# Patient Record
Sex: FEMALE | Race: WHITE | NOT HISPANIC OR LATINO | Employment: FULL TIME | URBAN - METROPOLITAN AREA
[De-identification: names, ages, dates, MRNs, and addresses within clinical notes are randomized per-mention and may not be internally consistent; named-entity substitution may affect disease eponyms.]

---

## 2015-08-18 LAB — HIV AG/AB 4TH GEN: NEGATIVE

## 2019-04-25 LAB
ABS NRBC COUNT: 0 X 10 3/UL (ref 0–0.01)
ABSOLUTE BASOPHIL: 0.03 X 10 3/UL (ref 0–0.22)
ABSOLUTE EOSINOPHIL: 0.06 X 10 3/UL (ref 0.04–0.54)
ABSOLUTE IMMATURE GRAN: 0.01 X 10 3/UL (ref 0–0.04)
ABSOLUTE LYMPHOCYTE: 1.33 X 10 3/UL (ref 0.86–4.75)
ABSOLUTE MONOCYTE: 0.31 X 10 3/UL (ref 0.22–1.08)
ALBUMIN SERPL-MCNC: 4.6 G/DL (ref 3.5–5.2)
ALBUMIN/GLOB SERPL ELPH: 2 {RATIO} (ref 1–2.7)
ALP ISOS SERPL LEV INH-CCNC: 39 IU/L (ref 35–105)
ALT (SGPT): 14 U/L (ref 0–33)
ANION GAP SERPL CALC-SCNC: 9 MMOL/L (ref 8–17)
AST SERPL-CCNC: 17 U/L (ref 0–32)
B12: 592 PG/ML (ref 232–1245)
BASOPHILS NFR BLD: 0.8 %
BILIRUBIN, TOTAL: 0.26 MG/DL (ref 0–1.2)
BUN/CREAT SERPL: 12 (ref 6–20)
CALCIUM SERPL-MCNC: 9.2 MG/DL (ref 8.6–10.2)
CARBON DIOXIDE, CO2: 29 MMOL/L (ref 22–29)
CHLORIDE: 103 MMOL/L (ref 98–107)
CHOLEST SERPL-MSCNC: 186 MG/DL (ref 100–200)
CREAT SERPL-MCNC: 0.74 MG/DL (ref 0.5–0.9)
EOSINOPHIL NFR BLD: 1.6 %
ESTIMATED AVERAGE GLUCOSE: 100 MG/DL
FOLATE SERPL-MCNC: 19.8 NG/ML (ref 4.4–31)
GFR ESTIMATION: 85.66
GLOBULIN: 2.3 G/DL (ref 1.5–4.5)
GLUCOSE: 101 MG/DL (ref 74–106)
HBA1C MFR BLD: 5.1 % (ref 4–6)
HCT VFR BLD AUTO: 40.5 % (ref 37–47)
HDLC SERPL-MCNC: 85 MG/DL
HGB BLD-MCNC: 13.1 G/DL (ref 12–16)
IMMATURE GRANULOCYTES: 0.3 % (ref 0–0.5)
LDL/HDL RATIO: 1 (ref 1–3)
LDLC SERPL CALC-MCNC: 88.4 MG/DL (ref 0–100)
LYMPHOCYTES NFR BLD: 35.8 %
MCH RBC QN AUTO: 30.5 PG (ref 27–32)
MCHC RBC AUTO-ENTMCNC: 32.3 G/DL (ref 32–36)
MCV RBC AUTO: 94.4 FL (ref 82–100)
MONOCYTES NFR BLD: 8.4 %
NEUTROPHILS ABSOLUTE COUNT: 1.97 X 10 3/UL (ref 2.15–7.56)
NEUTROPHILS NFR BLD: 53.1 %
NUCLEATED RED BLOOD CELLS: 0 /100 WBC (ref 0–0.2)
PLATELET # BLD AUTO: 231 X 10 3/UL (ref 135–400)
POTASSIUM: 4.2 MMOL/L (ref 3.5–5.1)
PROT SNV-MCNC: 6.9 G/DL (ref 6.4–8.3)
RBC # BLD AUTO: 4.29 X 10 6/UL (ref 4.2–5.4)
RDW-SD: 44.7 FL (ref 37–54)
SODIUM: 141 MMOL/L (ref 136–145)
TRIGL SERPL-MCNC: 63 MG/DL (ref 0–150)
TSH SERPL DL<=0.005 MIU/L-ACNC: 2.71 UIU/ML (ref 0.27–4.2)
UREA NITROGEN (BUN): 8.9 MG/DL (ref 6–20)
VITAMIN D (25OHD): 32.3 NG/ML
WBC # BLD: 3.71 X 10 3/UL (ref 4.3–10.8)

## 2019-04-26 ENCOUNTER — OFFICE VISIT (OUTPATIENT)
Dept: FAMILY MEDICINE | Facility: CLINIC | Age: 44
End: 2019-04-26
Payer: OTHER GOVERNMENT

## 2019-04-26 VITALS
TEMPERATURE: 97 F | OXYGEN SATURATION: 99 % | DIASTOLIC BLOOD PRESSURE: 80 MMHG | WEIGHT: 128 LBS | SYSTOLIC BLOOD PRESSURE: 130 MMHG | BODY MASS INDEX: 23.55 KG/M2 | HEIGHT: 62 IN | HEART RATE: 110 BPM | RESPIRATION RATE: 14 BRPM

## 2019-04-26 DIAGNOSIS — F41.9 ANXIETY: ICD-10-CM

## 2019-04-26 DIAGNOSIS — G43.719 INTRACTABLE CHRONIC MIGRAINE WITHOUT AURA AND WITHOUT STATUS MIGRAINOSUS: ICD-10-CM

## 2019-04-26 DIAGNOSIS — Z71.2 ENCOUNTER TO DISCUSS TEST RESULTS: Primary | ICD-10-CM

## 2019-04-26 DIAGNOSIS — E03.9 HYPOTHYROIDISM, UNSPECIFIED TYPE: ICD-10-CM

## 2019-04-26 PROCEDURE — 99213 PR OFFICE/OUTPT VISIT, EST, LEVL III, 20-29 MIN: ICD-10-PCS | Mod: S$GLB,,, | Performed by: FAMILY MEDICINE

## 2019-04-26 PROCEDURE — 99213 OFFICE O/P EST LOW 20 MIN: CPT | Mod: S$GLB,,, | Performed by: FAMILY MEDICINE

## 2019-04-26 RX ORDER — VENLAFAXINE HYDROCHLORIDE 37.5 MG/1
37.5 CAPSULE, EXTENDED RELEASE ORAL DAILY
Qty: 30 CAPSULE | Refills: 0 | Status: SHIPPED | OUTPATIENT
Start: 2019-04-26 | End: 2019-05-27 | Stop reason: SDUPTHER

## 2019-04-26 RX ORDER — FLUTICASONE PROPIONATE 50 MCG
SPRAY, SUSPENSION (ML) NASAL
COMMUNITY
End: 2020-06-02 | Stop reason: SDUPTHER

## 2019-04-26 RX ORDER — LEVOTHYROXINE SODIUM 25 UG/1
TABLET ORAL
COMMUNITY
End: 2019-10-19 | Stop reason: SDUPTHER

## 2019-04-26 RX ORDER — AMITRIPTYLINE HYDROCHLORIDE 75 MG/1
TABLET ORAL
COMMUNITY
End: 2019-05-27

## 2019-04-26 RX ORDER — CETIRIZINE HYDROCHLORIDE 10 MG/1
TABLET ORAL
COMMUNITY
End: 2019-10-19 | Stop reason: SDUPTHER

## 2019-04-26 RX ORDER — VENLAFAXINE HYDROCHLORIDE 37.5 MG/1
37.5 CAPSULE, EXTENDED RELEASE ORAL DAILY
Qty: 30 CAPSULE | Refills: 0 | Status: SHIPPED | OUTPATIENT
Start: 2019-04-26 | End: 2019-04-26

## 2019-04-26 RX ORDER — ESTRADIOL 0.1 MG/G
CREAM VAGINAL
COMMUNITY
End: 2022-06-10 | Stop reason: SDUPTHER

## 2019-04-26 NOTE — PATIENT INSTRUCTIONS
Plan: double your amitriptyline for now to help with insomnia, migraines and depression/anxiety  We also will start on venlafaxine    Next time if indicated we can discuss adding propanolol

## 2019-04-26 NOTE — PROGRESS NOTES
Subjective:       Patient ID: Laura Middleton is a 43 y.o. female.    Chief Complaint: Follow-up and Anxiety    44 yo F here to discuss lab results and anxiety.  Lab results: TSH is normal which means she is adequately treated. All other labs are normal as well.  Anxiety: pt worries all the time, she feels like she is in a bad mood all the time. She is shaking while talking to me about her anxiety / stress.   Pt is on amitriptyline for migraines - in the past 6 months she has had more migraines. Pt was aware that amitriptyline is an antidepressant and also used for insomnia.   Pt had been on Zoloft in the past and it helped for her depression which was different from her symptoms now.     Review of Systems   Constitutional: Negative for activity change, chills, fatigue, fever and unexpected weight change.   HENT: Negative for ear pain, rhinorrhea and trouble swallowing.    Eyes: Negative for pain.   Respiratory: Negative for cough, chest tightness, shortness of breath and wheezing.    Cardiovascular: Negative for chest pain and palpitations.   Gastrointestinal: Negative for abdominal distention, abdominal pain, constipation, diarrhea, nausea and vomiting.   Endocrine: Negative for cold intolerance and heat intolerance.   Genitourinary: Negative for dysuria, frequency and urgency.   Musculoskeletal: Negative for myalgias.   Skin: Negative for rash.   Neurological: Negative for dizziness, syncope, light-headedness and headaches.   Hematological: Does not bruise/bleed easily.   Psychiatric/Behavioral: Negative for agitation and confusion.       Objective:      Physical Exam   Constitutional: She appears well-developed.   HENT:   Right Ear: External ear normal.   Left Ear: External ear normal.   Mouth/Throat: Oropharynx is clear and moist.   Eyes: Conjunctivae and EOM are normal.   Neck: Normal range of motion.   Cardiovascular: Normal rate, regular rhythm and intact distal pulses.   Pulmonary/Chest: Effort normal and  breath sounds normal.   Abdominal: Soft.   Skin: Skin is warm. Capillary refill takes less than 2 seconds.   Psychiatric: She has a normal mood and affect.       Assessment:       1. Encounter to discuss test results    2. Anxiety    3. Hypothyroidism, unspecified type    4. Intractable chronic migraine without aura and without status migrainosus        Plan:       PROBLEM LIST     Laura was seen today for follow-up and anxiety.    Diagnoses and all orders for this visit:    Encounter to discuss test results    Anxiety  -     Discontinue: venlafaxine (EFFEXOR-XR) 37.5 MG 24 hr capsule; Take 1 capsule (37.5 mg total) by mouth once daily.  -     venlafaxine (EFFEXOR-XR) 37.5 MG 24 hr capsule; Take 1 capsule (37.5 mg total) by mouth once daily.    Hypothyroidism, unspecified type    Intractable chronic migraine without aura and without status migrainosus    Plan: double your amitriptyline for now to help with insomnia, migraines and depression/anxiety  We also will start on venlafaxine    Next time if indicated we can discuss adding propanolol

## 2019-05-27 ENCOUNTER — OFFICE VISIT (OUTPATIENT)
Dept: FAMILY MEDICINE | Facility: CLINIC | Age: 44
End: 2019-05-27
Payer: OTHER GOVERNMENT

## 2019-05-27 VITALS
TEMPERATURE: 98 F | BODY MASS INDEX: 23.55 KG/M2 | HEART RATE: 102 BPM | WEIGHT: 128 LBS | SYSTOLIC BLOOD PRESSURE: 122 MMHG | DIASTOLIC BLOOD PRESSURE: 72 MMHG | HEIGHT: 62 IN | OXYGEN SATURATION: 99 %

## 2019-05-27 DIAGNOSIS — G43.719 INTRACTABLE CHRONIC MIGRAINE WITHOUT AURA AND WITHOUT STATUS MIGRAINOSUS: ICD-10-CM

## 2019-05-27 DIAGNOSIS — F41.9 ANXIETY: Primary | ICD-10-CM

## 2019-05-27 PROCEDURE — 99213 OFFICE O/P EST LOW 20 MIN: CPT | Mod: S$GLB,,, | Performed by: FAMILY MEDICINE

## 2019-05-27 PROCEDURE — 99213 PR OFFICE/OUTPT VISIT, EST, LEVL III, 20-29 MIN: ICD-10-PCS | Mod: S$GLB,,, | Performed by: FAMILY MEDICINE

## 2019-05-27 RX ORDER — VENLAFAXINE HYDROCHLORIDE 37.5 MG/1
37.5 CAPSULE, EXTENDED RELEASE ORAL DAILY
Qty: 90 CAPSULE | Refills: 3 | Status: SHIPPED | OUTPATIENT
Start: 2019-05-27 | End: 2020-03-02

## 2019-05-27 RX ORDER — AMITRIPTYLINE HYDROCHLORIDE 150 MG/1
150 TABLET ORAL NIGHTLY
Qty: 90 TABLET | Refills: 3 | Status: SHIPPED | OUTPATIENT
Start: 2019-05-27 | End: 2020-06-02 | Stop reason: SDUPTHER

## 2019-05-27 NOTE — PROGRESS NOTES
Subjective:       Patient ID: Laura Middleton is a 43 y.o. female.    Chief Complaint: Follow-up (Pt is here for her 1 mo follow up. Pt stated that the new med is working & would like a refill.) and Medication Refill (Amitriptyline & Venlafaxine)    44 yo F here for f/u on anxiety/depression treatment. She was started on Effexor and we increased amitriptyline last visit. This works very well and pt would like to stay on these dosages. Pt feels better now.   No other problems or concerns at this time    Review of Systems   Constitutional: Negative for activity change, chills, fatigue, fever and unexpected weight change.   HENT: Negative for ear pain, rhinorrhea and trouble swallowing.    Eyes: Negative for pain.   Respiratory: Negative for cough, chest tightness, shortness of breath and wheezing.    Cardiovascular: Negative for chest pain and palpitations.   Gastrointestinal: Negative for abdominal distention, abdominal pain, constipation, diarrhea, nausea and vomiting.   Endocrine: Negative for cold intolerance and heat intolerance.   Genitourinary: Negative for dysuria, frequency and urgency.   Musculoskeletal: Negative for myalgias.   Skin: Negative for rash.   Neurological: Negative for dizziness, syncope, light-headedness and headaches.   Hematological: Does not bruise/bleed easily.   Psychiatric/Behavioral: Negative for agitation and confusion.       Objective:      Physical Exam   Constitutional: She appears well-developed.   HENT:   Right Ear: External ear normal.   Left Ear: External ear normal.   Mouth/Throat: Oropharynx is clear and moist.   Eyes: Conjunctivae and EOM are normal.   Neck: Normal range of motion.   Cardiovascular: Normal rate, regular rhythm and intact distal pulses.   Pulmonary/Chest: Effort normal and breath sounds normal.   Abdominal: Soft.   Skin: Skin is warm. Capillary refill takes less than 2 seconds.   Psychiatric: She has a normal mood and affect.       Assessment:       1. Anxiety     2. Intractable chronic migraine without aura and without status migrainosus        Plan:       PROBLEM LIST     Laura was seen today for follow-up and medication refill.    Diagnoses and all orders for this visit:    Anxiety  -     amitriptyline (ELAVIL) 150 MG Tab; Take 1 tablet (150 mg total) by mouth every evening.  -     venlafaxine (EFFEXOR-XR) 37.5 MG 24 hr capsule; Take 1 capsule (37.5 mg total) by mouth once daily.    Intractable chronic migraine without aura and without status migrainosus  -     amitriptyline (ELAVIL) 150 MG Tab; Take 1 tablet (150 mg total) by mouth every evening.

## 2019-10-20 RX ORDER — LEVOTHYROXINE SODIUM 25 UG/1
TABLET ORAL
Qty: 90 TABLET | Refills: 4 | Status: SHIPPED | OUTPATIENT
Start: 2019-10-20 | End: 2020-06-02

## 2019-10-20 RX ORDER — CETIRIZINE HYDROCHLORIDE 10 MG/1
TABLET ORAL
Qty: 90 TABLET | Refills: 4 | Status: SHIPPED | OUTPATIENT
Start: 2019-10-20 | End: 2021-06-01 | Stop reason: SDUPTHER

## 2020-03-02 ENCOUNTER — OFFICE VISIT (OUTPATIENT)
Dept: FAMILY MEDICINE | Facility: CLINIC | Age: 45
End: 2020-03-02
Payer: OTHER GOVERNMENT

## 2020-03-02 VITALS
HEIGHT: 62 IN | WEIGHT: 135.63 LBS | HEART RATE: 101 BPM | OXYGEN SATURATION: 98 % | SYSTOLIC BLOOD PRESSURE: 130 MMHG | DIASTOLIC BLOOD PRESSURE: 80 MMHG | BODY MASS INDEX: 24.96 KG/M2 | TEMPERATURE: 97 F | RESPIRATION RATE: 16 BRPM

## 2020-03-02 DIAGNOSIS — R25.1 TREMOR: Chronic | ICD-10-CM

## 2020-03-02 DIAGNOSIS — F41.9 ANXIETY AND DEPRESSION: Chronic | ICD-10-CM

## 2020-03-02 DIAGNOSIS — F32.A ANXIETY AND DEPRESSION: Chronic | ICD-10-CM

## 2020-03-02 DIAGNOSIS — Z00.00 WELLNESS EXAMINATION: Primary | ICD-10-CM

## 2020-03-02 DIAGNOSIS — E03.9 HYPOTHYROIDISM, UNSPECIFIED TYPE: Chronic | ICD-10-CM

## 2020-03-02 DIAGNOSIS — R00.0 TACHYCARDIA: Chronic | ICD-10-CM

## 2020-03-02 PROCEDURE — 99396 PR PREVENTIVE VISIT,EST,40-64: ICD-10-PCS | Mod: S$GLB,,, | Performed by: FAMILY MEDICINE

## 2020-03-02 PROCEDURE — 99396 PREV VISIT EST AGE 40-64: CPT | Mod: S$GLB,,, | Performed by: FAMILY MEDICINE

## 2020-03-02 RX ORDER — VENLAFAXINE HYDROCHLORIDE 75 MG/1
75 CAPSULE, EXTENDED RELEASE ORAL DAILY
Qty: 90 CAPSULE | Refills: 0 | Status: SHIPPED | OUTPATIENT
Start: 2020-03-02 | End: 2020-06-02 | Stop reason: SDUPTHER

## 2020-03-02 NOTE — PROGRESS NOTES
Subjective:       Patient ID: Laura Middleton is a 44 y.o. female.    Chief Complaint: Follow-up (pt wants to talk about increasing her effexor)    43 yo F here for her wellness exam and for anxiety/depression.  Anxiety/depression: pt is on Effexor 37.5 mg ER and it worked okay for the past year but now she is thinking that the efficacy is coming down and she'd like to have it increased.  She is also on amitriptyline 150 mg for migraine prevention. Discussed that this is also an antidepressant.   Wellness: pt has been tested for HIV, has had her TdaP, is current on Mammogram and Pap smear through her gyn.     Review of Systems   Constitutional: Negative for activity change, chills, fatigue, fever and unexpected weight change.   HENT: Negative for ear pain, rhinorrhea and trouble swallowing.    Eyes: Negative for pain.   Respiratory: Negative for cough, chest tightness, shortness of breath and wheezing.    Cardiovascular: Negative for chest pain and palpitations.   Gastrointestinal: Negative for abdominal distention, abdominal pain, constipation, diarrhea, nausea and vomiting.   Endocrine: Negative for cold intolerance and heat intolerance.   Genitourinary: Negative for dysuria, frequency and urgency.   Musculoskeletal: Negative for myalgias.   Skin: Negative for rash.   Neurological: Negative for dizziness, syncope, light-headedness and headaches.   Hematological: Does not bruise/bleed easily.   Psychiatric/Behavioral: Negative for agitation and confusion.       Objective:      Physical Exam   Constitutional: She appears well-developed.   HENT:   Right Ear: External ear normal.   Left Ear: External ear normal.   Mouth/Throat: Oropharynx is clear and moist.   Eyes: Conjunctivae and EOM are normal.   Neck: Normal range of motion.   Cardiovascular: Normal rate, regular rhythm and intact distal pulses.   Pulmonary/Chest: Effort normal and breath sounds normal.   Abdominal: Soft.   Musculoskeletal: Normal range of motion.    Neurological: She is alert.   Skin: Skin is warm. Capillary refill takes less than 2 seconds.   Psychiatric: She has a normal mood and affect.   Nursing note and vitals reviewed.      Assessment:       1. Wellness examination    2. Hypothyroidism, unspecified type Active   3. Anxiety and depression Sub-optimally controlled   4. Tremor Active   5. Tachycardia Stable       Plan:       PROBLEM LIST     Laura was seen today for follow-up.    Diagnoses and all orders for this visit:    Wellness examination  -     CBC auto differential; Future  -     Comprehensive metabolic panel; Future  -     Lipid panel; Future  -     Vitamin D; Future  -     Hemoglobin A1c; Future  -     TSH; Future  -     Vitamin B12; Future  -     CBC auto differential  -     Comprehensive metabolic panel  -     Lipid panel  -     Vitamin D  -     Hemoglobin A1c  -     TSH  -     Vitamin B12    Hypothyroidism, unspecified type  Comments:  labs today  Orders:  -     TSH; Future  -     TSH    Anxiety and depression  Comments:  will increase effexor today  Orders:  -     venlafaxine (EFFEXOR-XR) 75 MG 24 hr capsule; Take 1 capsule (75 mg total) by mouth once daily.    Tremor  Comments:  off/on    Tachycardia  Comments:  monitor - will think of propranolol in the future

## 2020-05-19 LAB
ABS NRBC COUNT: 0 X 10 3/UL (ref 0–0.01)
ABSOLUTE BASOPHIL: 0.02 X 10 3/UL (ref 0–0.22)
ABSOLUTE EOSINOPHIL: 0.03 X 10 3/UL (ref 0.04–0.54)
ABSOLUTE IMMATURE GRAN: 0.01 X 10 3/UL (ref 0–0.04)
ABSOLUTE LYMPHOCYTE: 1.07 X 10 3/UL (ref 0.86–4.75)
ABSOLUTE MONOCYTE: 0.27 X 10 3/UL (ref 0.22–1.08)
ALBUMIN SERPL-MCNC: 4.3 G/DL (ref 3.5–5.2)
ALBUMIN/GLOB SERPL ELPH: 2 {RATIO} (ref 1–2.7)
ALP ISOS SERPL LEV INH-CCNC: 39 U/L (ref 35–105)
ALT (SGPT): 11 U/L (ref 0–33)
ANION GAP SERPL CALC-SCNC: 11 MMOL/L (ref 8–17)
AST SERPL-CCNC: 11 U/L (ref 0–32)
B12: 546 PG/ML (ref 232–1245)
BASOPHILS NFR BLD: 0.6 % (ref 0.2–1.2)
BILIRUBIN, TOTAL: 0.32 MG/DL (ref 0–1.2)
BUN/CREAT SERPL: 12.4 (ref 6–20)
CALCIUM SERPL-MCNC: 9.1 MG/DL (ref 8.6–10.2)
CARBON DIOXIDE, CO2: 28 MMOL/L (ref 22–29)
CHLORIDE: 104 MMOL/L (ref 98–107)
CHOLEST SERPL-MSCNC: 171 MG/DL (ref 100–200)
CREAT SERPL-MCNC: 0.78 MG/DL (ref 0.5–0.9)
EOSINOPHIL NFR BLD: 0.8 % (ref 0.7–7)
ESTIMATED AVERAGE GLUCOSE: 101 MG/DL
GFR ESTIMATION: 80.23
GLOBULIN: 2.1 G/DL (ref 1.5–4.5)
GLUCOSE: 105 MG/DL (ref 74–106)
HBA1C MFR BLD: 5.1 % (ref 4–6)
HCT VFR BLD AUTO: 38.3 % (ref 37–47)
HDLC SERPL-MCNC: 75 MG/DL
HGB BLD-MCNC: 12.1 G/DL (ref 12–16)
IMMATURE GRANULOCYTES: 0.3 % (ref 0–0.5)
LDL/HDL RATIO: 1.1 (ref 1–3)
LDLC SERPL CALC-MCNC: 80.2 MG/DL (ref 0–100)
LYMPHOCYTES NFR BLD: 29.5 % (ref 19.3–53.1)
MCH RBC QN AUTO: 30.5 PG (ref 27–32)
MCHC RBC AUTO-ENTMCNC: 31.6 G/DL (ref 32–36)
MCV RBC AUTO: 96.5 FL (ref 82–100)
MONOCYTES NFR BLD: 7.4 % (ref 4.7–12.5)
NEUTROPHILS ABSOLUTE COUNT: 2.23 X 10 3/UL (ref 2.15–7.56)
NEUTROPHILS NFR BLD: 61.4 %
NUCLEATED RED BLOOD CELLS: 0 /100 WBC (ref 0–0.2)
PLATELET # BLD AUTO: 198 X 10 3/UL (ref 135–400)
POTASSIUM: 4.8 MMOL/L (ref 3.5–5.1)
PROT SNV-MCNC: 6.4 G/DL (ref 6.4–8.3)
RBC # BLD AUTO: 3.97 X 10 6/UL (ref 4.2–5.4)
RDW-SD: 45.1 FL (ref 37–54)
SODIUM: 143 MMOL/L (ref 136–145)
TRIGL SERPL-MCNC: 79 MG/DL (ref 0–150)
TSH SERPL DL<=0.005 MIU/L-ACNC: 5.08 UIU/ML (ref 0.27–4.2)
UREA NITROGEN (BUN): 9.7 MG/DL (ref 6–20)
VITAMIN D (25OHD): 30 NG/ML
WBC # BLD: 3.63 X 10 3/UL (ref 4.3–10.8)

## 2020-06-02 ENCOUNTER — OFFICE VISIT (OUTPATIENT)
Dept: FAMILY MEDICINE | Facility: CLINIC | Age: 45
End: 2020-06-02
Payer: OTHER GOVERNMENT

## 2020-06-02 VITALS
OXYGEN SATURATION: 99 % | HEART RATE: 100 BPM | SYSTOLIC BLOOD PRESSURE: 126 MMHG | WEIGHT: 135.38 LBS | RESPIRATION RATE: 16 BRPM | TEMPERATURE: 97 F | BODY MASS INDEX: 24.91 KG/M2 | DIASTOLIC BLOOD PRESSURE: 79 MMHG | HEIGHT: 62 IN

## 2020-06-02 DIAGNOSIS — G47.00 INSOMNIA, UNSPECIFIED TYPE: Chronic | ICD-10-CM

## 2020-06-02 DIAGNOSIS — F41.9 ANXIETY: Chronic | ICD-10-CM

## 2020-06-02 DIAGNOSIS — E03.9 HYPOTHYROIDISM, UNSPECIFIED TYPE: Chronic | ICD-10-CM

## 2020-06-02 DIAGNOSIS — J30.2 SEASONAL ALLERGIES: ICD-10-CM

## 2020-06-02 DIAGNOSIS — G43.719 INTRACTABLE CHRONIC MIGRAINE WITHOUT AURA AND WITHOUT STATUS MIGRAINOSUS: Chronic | ICD-10-CM

## 2020-06-02 DIAGNOSIS — Z71.2 ENCOUNTER TO DISCUSS TEST RESULTS: ICD-10-CM

## 2020-06-02 DIAGNOSIS — F32.A DEPRESSION, UNSPECIFIED DEPRESSION TYPE: Primary | Chronic | ICD-10-CM

## 2020-06-02 PROCEDURE — 99214 PR OFFICE/OUTPT VISIT, EST, LEVL IV, 30-39 MIN: ICD-10-PCS | Mod: S$GLB,,, | Performed by: FAMILY MEDICINE

## 2020-06-02 PROCEDURE — 99214 OFFICE O/P EST MOD 30 MIN: CPT | Mod: S$GLB,,, | Performed by: FAMILY MEDICINE

## 2020-06-02 RX ORDER — FLUTICASONE PROPIONATE 50 MCG
2 SPRAY, SUSPENSION (ML) NASAL 2 TIMES DAILY
Qty: 15.8 G | Refills: 6 | Status: SHIPPED | OUTPATIENT
Start: 2020-06-02 | End: 2021-06-01 | Stop reason: SDUPTHER

## 2020-06-02 RX ORDER — LEVOTHYROXINE SODIUM 50 UG/1
50 TABLET ORAL
Qty: 90 TABLET | Refills: 3 | Status: SHIPPED | OUTPATIENT
Start: 2020-06-02 | End: 2021-06-01 | Stop reason: SDUPTHER

## 2020-06-02 RX ORDER — AMITRIPTYLINE HYDROCHLORIDE 150 MG/1
150 TABLET ORAL NIGHTLY
Qty: 90 TABLET | Refills: 3 | Status: SHIPPED | OUTPATIENT
Start: 2020-06-02 | End: 2021-06-01 | Stop reason: SDUPTHER

## 2020-06-02 RX ORDER — VENLAFAXINE HYDROCHLORIDE 75 MG/1
75 CAPSULE, EXTENDED RELEASE ORAL DAILY
Qty: 90 CAPSULE | Refills: 3 | Status: SHIPPED | OUTPATIENT
Start: 2020-06-02 | End: 2021-06-01 | Stop reason: SDUPTHER

## 2020-06-02 NOTE — PROGRESS NOTES
Subjective:       Patient ID: Laura Middleton is a 44 y.o. female.    Chief Complaint: Follow-up (pt is here to go over her lab results and last time she was here we increased her venlafaxine is here to talk about how it is working.)    43 yo F here for discussion of lab results, allergies, insomnia, depression/anxiety.  Test results show largely normal results, vitamin d = 30. TSH was 5.+. Pt is on 25 mcg of levothyroxine.  One year ago TSH was normal at 2.+. We will increase the levothyroxine to 50 mcg and retest in 6 months as her thyroid does not seem stable.   Allergies: mostly seasonal which are acting up just now. Pt needs a refill on flonase. She also takes other otc meds for which she does not need any refills.   Insomnia: pt is on a high dose amitriptyline. She had been titrated by her past doctor and that's the dosage she needs. She'd like to have that refilled as well. She is compliant nad has no aEs.   Depression and anxiety: this seems to be better controlled. We adjusted pt's effexor last visit and this works much better. Pt okay with getting it filled for a whole year. Pt has no AEs.     Review of Systems   Constitutional: Negative for activity change, chills, fatigue, fever and unexpected weight change.   HENT: Negative for ear pain, rhinorrhea and trouble swallowing.    Eyes: Negative for pain.   Respiratory: Negative for cough, chest tightness, shortness of breath and wheezing.    Cardiovascular: Negative for chest pain and palpitations.   Gastrointestinal: Negative for abdominal distention, abdominal pain, constipation, diarrhea, nausea and vomiting.   Endocrine: Negative for cold intolerance and heat intolerance.   Genitourinary: Negative for dysuria, frequency and urgency.   Musculoskeletal: Negative for myalgias.   Skin: Negative for rash.   Neurological: Negative for dizziness, syncope, light-headedness and headaches.   Hematological: Does not bruise/bleed easily.    Psychiatric/Behavioral: Negative for agitation and confusion.       Objective:      Physical Exam   Constitutional: She appears well-developed.   HENT:   Right Ear: External ear normal.   Left Ear: External ear normal.   Mouth/Throat: Oropharynx is clear and moist.   Eyes: Conjunctivae and EOM are normal.   Neck: Normal range of motion.   Cardiovascular: Normal rate, regular rhythm and intact distal pulses.   Pulmonary/Chest: Effort normal and breath sounds normal.   Abdominal: Soft.   Musculoskeletal: Normal range of motion.   Neurological: She is alert.   Skin: Skin is warm. Capillary refill takes less than 2 seconds.   Psychiatric: She has a normal mood and affect. Her behavior is normal.   Nursing note and vitals reviewed.      Assessment:       1. Depression, unspecified depression type Continue current regimen   2. Encounter to discuss test results    3. Anxiety Continue current regimen   4. Intractable chronic migraine without aura and without status migrainosus Stable   5. Hypothyroidism, unspecified type Adjusting regimen per notes   6. Insomnia, unspecified type Continue current regimen   7. Seasonal allergies Active       Plan:       PROBLEM LIST     Laura was seen today for follow-up.    Diagnoses and all orders for this visit:    Depression, unspecified depression type  Comments:  controlled better now  Orders:  -     venlafaxine (EFFEXOR-XR) 75 MG 24 hr capsule; Take 1 capsule (75 mg total) by mouth once daily.    Encounter to discuss test results    Anxiety  Comments:  controlled  Orders:  -     amitriptyline (ELAVIL) 150 MG Tab; Take 1 tablet (150 mg total) by mouth every evening.    Intractable chronic migraine without aura and without status migrainosus  Comments:  pt takes amitriptyline and it controlles mostly  Orders:  -     amitriptyline (ELAVIL) 150 MG Tab; Take 1 tablet (150 mg total) by mouth every evening.    Hypothyroidism, unspecified type  Comments:  TSH = 5 -> increase levothyroxine  to 50 mcg  Orders:  -     levothyroxine (SYNTHROID) 50 MCG tablet; Take 1 tablet (50 mcg total) by mouth before breakfast.    Insomnia, unspecified type  Comments:  controlled on amitriptyline  Orders:  -     amitriptyline (ELAVIL) 150 MG Tab; Take 1 tablet (150 mg total) by mouth every evening.    Seasonal allergies  Comments:  flonase refilled  Orders:  -     fluticasone propionate (FLONASE) 50 mcg/actuation nasal spray; 2 sprays (100 mcg total) by Each Nostril route 2 (two) times a day.

## 2020-10-05 PROBLEM — E03.9 HYPOTHYROIDISM: Status: ACTIVE | Noted: 2018-04-27
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
## 2020-10-06 ENCOUNTER — OFFICE VISIT (OUTPATIENT)
Dept: OBSTETRICS AND GYNECOLOGY | Facility: CLINIC | Age: 45
End: 2020-10-06
Payer: OTHER GOVERNMENT

## 2020-10-06 VITALS
DIASTOLIC BLOOD PRESSURE: 77 MMHG | BODY MASS INDEX: 25.18 KG/M2 | SYSTOLIC BLOOD PRESSURE: 121 MMHG | WEIGHT: 136.81 LBS | HEIGHT: 62 IN | HEART RATE: 102 BPM

## 2020-10-06 DIAGNOSIS — E03.9 HYPOTHYROIDISM, UNSPECIFIED TYPE: ICD-10-CM

## 2020-10-06 DIAGNOSIS — L65.9 HAIR LOSS: ICD-10-CM

## 2020-10-06 DIAGNOSIS — Z01.419 ENCOUNTER FOR WELL WOMAN EXAM WITH ROUTINE GYNECOLOGICAL EXAM: Primary | ICD-10-CM

## 2020-10-06 DIAGNOSIS — D70.9 NEUTROPENIA, UNSPECIFIED TYPE: ICD-10-CM

## 2020-10-06 DIAGNOSIS — N92.6 MENSES, IRREGULAR: ICD-10-CM

## 2020-10-06 DIAGNOSIS — N95.2 ATROPHIC VAGINITIS: ICD-10-CM

## 2020-10-06 DIAGNOSIS — Z12.31 BREAST CANCER SCREENING BY MAMMOGRAM: ICD-10-CM

## 2020-10-06 PROCEDURE — 99396 PREV VISIT EST AGE 40-64: CPT | Mod: S$GLB,,, | Performed by: OBSTETRICS & GYNECOLOGY

## 2020-10-06 PROCEDURE — 99214 OFFICE O/P EST MOD 30 MIN: CPT | Mod: 25,S$GLB,, | Performed by: OBSTETRICS & GYNECOLOGY

## 2020-10-06 PROCEDURE — 99214 PR OFFICE/OUTPT VISIT, EST, LEVL IV, 30-39 MIN: ICD-10-PCS | Mod: 25,S$GLB,, | Performed by: OBSTETRICS & GYNECOLOGY

## 2020-10-06 PROCEDURE — 99396 PR PREVENTIVE VISIT,EST,40-64: ICD-10-PCS | Mod: S$GLB,,, | Performed by: OBSTETRICS & GYNECOLOGY

## 2020-10-26 ENCOUNTER — TELEPHONE (OUTPATIENT)
Dept: OBSTETRICS AND GYNECOLOGY | Facility: CLINIC | Age: 45
End: 2020-10-26

## 2020-10-27 NOTE — TELEPHONE ENCOUNTER
Please call patient and let her know we have her lab results (but not yet interfaced yet). Her wbc previously was low but now is normal.  Her periods were irregular but her FSH is 6.68 so not close to menopause just yet.  She also was on thyroid medication and her tsh is 2.85 and free t4 is on low end of normal at 1.07.  According to endocrinology, when on synthroid they aim to keep the TSH at or below 2 so it is possible they may want to increase her thyroid medicine and that may help with her menstrual irregularities as well. But, it is ok for the moment at this level- just may have a little room to improve it.  So she can take a copy of this to her pcp or we can send to endocrinology for mgmt.  I have the paper copy if she wants a copy or we need to fax it.

## 2020-10-27 NOTE — TELEPHONE ENCOUNTER
Spoke with patient. Two pt identifiers confirmed. Notified patient of result of labs and Dr. Prince's rec.. Patient verbalized understanding. She will  a copy.

## 2021-01-19 LAB — BCS RECOMMENDATION EXT: NORMAL

## 2021-05-12 ENCOUNTER — PATIENT MESSAGE (OUTPATIENT)
Dept: RESEARCH | Facility: HOSPITAL | Age: 46
End: 2021-05-12

## 2021-06-01 ENCOUNTER — OFFICE VISIT (OUTPATIENT)
Dept: FAMILY MEDICINE | Facility: CLINIC | Age: 46
End: 2021-06-01
Payer: OTHER GOVERNMENT

## 2021-06-01 VITALS
HEIGHT: 62 IN | DIASTOLIC BLOOD PRESSURE: 81 MMHG | SYSTOLIC BLOOD PRESSURE: 132 MMHG | OXYGEN SATURATION: 97 % | BODY MASS INDEX: 26.13 KG/M2 | WEIGHT: 142 LBS | RESPIRATION RATE: 18 BRPM | HEART RATE: 95 BPM | TEMPERATURE: 98 F

## 2021-06-01 DIAGNOSIS — Z11.59 ENCOUNTER FOR SCREENING FOR OTHER VIRAL DISEASES: Primary | ICD-10-CM

## 2021-06-01 DIAGNOSIS — G47.00 INSOMNIA, UNSPECIFIED TYPE: Chronic | ICD-10-CM

## 2021-06-01 DIAGNOSIS — F32.A DEPRESSION, UNSPECIFIED DEPRESSION TYPE: Chronic | ICD-10-CM

## 2021-06-01 DIAGNOSIS — G43.719 INTRACTABLE CHRONIC MIGRAINE WITHOUT AURA AND WITHOUT STATUS MIGRAINOSUS: Chronic | ICD-10-CM

## 2021-06-01 DIAGNOSIS — Z23 NEED FOR DIPHTHERIA-TETANUS-PERTUSSIS (TDAP) VACCINE: ICD-10-CM

## 2021-06-01 DIAGNOSIS — J30.2 SEASONAL ALLERGIES: ICD-10-CM

## 2021-06-01 DIAGNOSIS — F41.9 ANXIETY: Chronic | ICD-10-CM

## 2021-06-01 DIAGNOSIS — E03.9 HYPOTHYROIDISM, UNSPECIFIED TYPE: Chronic | ICD-10-CM

## 2021-06-01 PROCEDURE — 90471 IMMUNIZATION ADMIN: CPT | Mod: S$GLB,,, | Performed by: NURSE PRACTITIONER

## 2021-06-01 PROCEDURE — 99214 PR OFFICE/OUTPT VISIT, EST, LEVL IV, 30-39 MIN: ICD-10-PCS | Mod: 25,S$GLB,, | Performed by: NURSE PRACTITIONER

## 2021-06-01 PROCEDURE — 90715 TDAP VACCINE GREATER THAN OR EQUAL TO 7YO IM: ICD-10-PCS | Mod: S$GLB,,, | Performed by: NURSE PRACTITIONER

## 2021-06-01 PROCEDURE — 90715 TDAP VACCINE 7 YRS/> IM: CPT | Mod: S$GLB,,, | Performed by: NURSE PRACTITIONER

## 2021-06-01 PROCEDURE — 90471 TDAP VACCINE GREATER THAN OR EQUAL TO 7YO IM: ICD-10-PCS | Mod: S$GLB,,, | Performed by: NURSE PRACTITIONER

## 2021-06-01 PROCEDURE — 99214 OFFICE O/P EST MOD 30 MIN: CPT | Mod: 25,S$GLB,, | Performed by: NURSE PRACTITIONER

## 2021-06-01 RX ORDER — ESTRADIOL 0.1 MG/G
CREAM VAGINAL
Status: CANCELLED | OUTPATIENT
Start: 2021-06-01

## 2021-06-01 RX ORDER — LEVOTHYROXINE SODIUM 50 UG/1
50 TABLET ORAL
Qty: 30 TABLET | Refills: 0 | Status: SHIPPED | OUTPATIENT
Start: 2021-06-01 | End: 2021-06-29

## 2021-06-01 RX ORDER — VENLAFAXINE HYDROCHLORIDE 150 MG/1
150 CAPSULE, EXTENDED RELEASE ORAL DAILY
Qty: 30 CAPSULE | Refills: 0 | Status: SHIPPED | OUTPATIENT
Start: 2021-06-01 | End: 2021-06-29

## 2021-06-01 RX ORDER — AMITRIPTYLINE HYDROCHLORIDE 150 MG/1
150 TABLET ORAL NIGHTLY
Qty: 30 TABLET | Refills: 0 | Status: SHIPPED | OUTPATIENT
Start: 2021-06-01 | End: 2021-06-29

## 2021-06-01 RX ORDER — FLUTICASONE PROPIONATE 50 MCG
2 SPRAY, SUSPENSION (ML) NASAL 2 TIMES DAILY
Qty: 15.8 G | Refills: 0 | Status: SHIPPED | OUTPATIENT
Start: 2021-06-01 | End: 2022-06-10 | Stop reason: SDUPTHER

## 2021-06-01 RX ORDER — VENLAFAXINE HYDROCHLORIDE 75 MG/1
75 CAPSULE, EXTENDED RELEASE ORAL DAILY
Qty: 30 CAPSULE | Refills: 0 | Status: SHIPPED | OUTPATIENT
Start: 2021-06-01 | End: 2021-06-01

## 2021-06-01 RX ORDER — METHYLPREDNISOLONE 4 MG/1
TABLET ORAL
COMMUNITY
Start: 2020-12-28 | End: 2021-07-13

## 2021-06-01 RX ORDER — CETIRIZINE HYDROCHLORIDE 10 MG/1
10 TABLET ORAL DAILY
Qty: 30 TABLET | Refills: 0 | Status: SHIPPED | OUTPATIENT
Start: 2021-06-01 | End: 2021-06-29

## 2021-06-18 LAB
ABS NRBC COUNT: 0 X 10 3/UL (ref 0–0.01)
ABSOLUTE BASOPHIL: 0.04 X 10 3/UL (ref 0–0.22)
ABSOLUTE EOSINOPHIL: 0.05 X 10 3/UL (ref 0.04–0.54)
ABSOLUTE IMMATURE GRAN: 0.01 X 10 3/UL (ref 0–0.04)
ABSOLUTE LYMPHOCYTE: 1.92 X 10 3/UL (ref 0.86–4.75)
ABSOLUTE MONOCYTE: 0.44 X 10 3/UL (ref 0.22–1.08)
ALBUMIN SERPL-MCNC: 4.3 G/DL (ref 3.5–5.2)
ALBUMIN/GLOB SERPL ELPH: 2 {RATIO} (ref 1–2.7)
ALP ISOS SERPL LEV INH-CCNC: 43 U/L (ref 35–105)
ALT (SGPT): 9 U/L (ref 0–33)
ANION GAP SERPL CALC-SCNC: 8 MMOL/L (ref 8–17)
AST SERPL-CCNC: 15 U/L (ref 0–32)
BASOPHILS NFR BLD: 0.8 % (ref 0.2–1.2)
BILIRUBIN, TOTAL: 0.19 MG/DL (ref 0–1.2)
BUN/CREAT SERPL: 15.9 (ref 6–20)
CALCIUM SERPL-MCNC: 9.1 MG/DL (ref 8.6–10.2)
CARBON DIOXIDE, CO2: 27 MMOL/L (ref 22–29)
CHLORIDE: 102 MMOL/L (ref 98–107)
CHOLEST SERPL-MSCNC: 180 MG/DL (ref 100–200)
CREAT SERPL-MCNC: 0.68 MG/DL (ref 0.5–0.9)
EOSINOPHIL NFR BLD: 1 % (ref 0.7–7)
GFR ESTIMATION: 93.57
GLOBULIN: 2.2 G/DL (ref 1.5–4.5)
GLUCOSE: 98 MG/DL (ref 74–106)
HCT VFR BLD AUTO: 39.1 % (ref 37–47)
HCV IGG SERPL QL IA: NONREACTIVE
HDLC SERPL-MCNC: 76 MG/DL
HGB BLD-MCNC: 12.7 G/DL (ref 12–16)
IMMATURE GRANULOCYTES: 0.2 % (ref 0–0.5)
LDL/HDL RATIO: 1.2 (ref 1–3)
LDLC SERPL CALC-MCNC: 90.2 MG/DL (ref 0–100)
LYMPHOCYTES NFR BLD: 37.7 % (ref 19.3–53.1)
MCH RBC QN AUTO: 29.8 PG (ref 27–32)
MCHC RBC AUTO-ENTMCNC: 32.5 G/DL (ref 32–36)
MCV RBC AUTO: 91.8 FL (ref 82–100)
MONOCYTES NFR BLD: 8.6 % (ref 4.7–12.5)
NEUTROPHILS # BLD AUTO: 2.63 X 10 3/UL (ref 2.15–7.56)
NEUTROPHILS NFR BLD: 51.7 %
NUCLEATED RED BLOOD CELLS: 0 /100 WBC (ref 0–0.2)
PLATELET # BLD AUTO: 243 X 10 3/UL (ref 135–400)
POTASSIUM: 4.4 MMOL/L (ref 3.5–5.1)
PROT SNV-MCNC: 6.5 G/DL (ref 6.4–8.3)
RBC # BLD AUTO: 4.26 X 10 6/UL (ref 4.2–5.4)
RDW-SD: 45 FL (ref 37–54)
SODIUM: 137 MMOL/L (ref 136–145)
TRIGL SERPL-MCNC: 69 MG/DL (ref 0–150)
TSH W/REFLEX TO FT4: 2.81 UIU/ML (ref 0.27–4.2)
UREA NITROGEN (BUN): 10.8 MG/DL (ref 6–20)
WBC # BLD: 5.09 X 10 3/UL (ref 4.3–10.8)

## 2021-07-13 ENCOUNTER — OFFICE VISIT (OUTPATIENT)
Dept: FAMILY MEDICINE | Facility: CLINIC | Age: 46
End: 2021-07-13
Payer: OTHER GOVERNMENT

## 2021-07-13 VITALS
WEIGHT: 140.38 LBS | HEART RATE: 94 BPM | TEMPERATURE: 98 F | DIASTOLIC BLOOD PRESSURE: 81 MMHG | BODY MASS INDEX: 25.83 KG/M2 | SYSTOLIC BLOOD PRESSURE: 125 MMHG | RESPIRATION RATE: 18 BRPM | OXYGEN SATURATION: 99 % | HEIGHT: 62 IN

## 2021-07-13 DIAGNOSIS — E03.9 HYPOTHYROIDISM, UNSPECIFIED TYPE: Chronic | ICD-10-CM

## 2021-07-13 DIAGNOSIS — F41.9 ANXIETY: Primary | Chronic | ICD-10-CM

## 2021-07-13 DIAGNOSIS — G47.00 INSOMNIA, UNSPECIFIED TYPE: ICD-10-CM

## 2021-07-13 DIAGNOSIS — F32.A DEPRESSION, UNSPECIFIED DEPRESSION TYPE: Chronic | ICD-10-CM

## 2021-07-13 DIAGNOSIS — G43.719 INTRACTABLE CHRONIC MIGRAINE WITHOUT AURA AND WITHOUT STATUS MIGRAINOSUS: Chronic | ICD-10-CM

## 2021-07-13 PROCEDURE — 99213 PR OFFICE/OUTPT VISIT, EST, LEVL III, 20-29 MIN: ICD-10-PCS | Mod: S$GLB,,, | Performed by: NURSE PRACTITIONER

## 2021-07-13 PROCEDURE — 99213 OFFICE O/P EST LOW 20 MIN: CPT | Mod: S$GLB,,, | Performed by: NURSE PRACTITIONER

## 2021-07-13 RX ORDER — BUTALBITAL, ACETAMINOPHEN AND CAFFEINE 50; 325; 40 MG/1; MG/1; MG/1
1 TABLET ORAL EVERY 4 HOURS PRN
COMMUNITY
End: 2021-07-13 | Stop reason: SDUPTHER

## 2021-07-13 RX ORDER — BUTALBITAL, ACETAMINOPHEN AND CAFFEINE 50; 325; 40 MG/1; MG/1; MG/1
1 TABLET ORAL EVERY 4 HOURS PRN
Qty: 30 TABLET | Refills: 2 | Status: SHIPPED | OUTPATIENT
Start: 2021-07-13 | End: 2022-06-10 | Stop reason: SDUPTHER

## 2022-01-18 ENCOUNTER — OFFICE VISIT (OUTPATIENT)
Dept: OBSTETRICS AND GYNECOLOGY | Facility: CLINIC | Age: 47
End: 2022-01-18
Payer: OTHER GOVERNMENT

## 2022-01-18 VITALS
SYSTOLIC BLOOD PRESSURE: 147 MMHG | HEART RATE: 92 BPM | DIASTOLIC BLOOD PRESSURE: 82 MMHG | WEIGHT: 147 LBS | HEIGHT: 65 IN | BODY MASS INDEX: 24.49 KG/M2

## 2022-01-18 DIAGNOSIS — Z12.11 SCREENING FOR COLON CANCER: ICD-10-CM

## 2022-01-18 DIAGNOSIS — Z12.31 BREAST CANCER SCREENING BY MAMMOGRAM: ICD-10-CM

## 2022-01-18 DIAGNOSIS — Z01.419 ENCOUNTER FOR WELL WOMAN EXAM WITH ROUTINE GYNECOLOGICAL EXAM: Primary | ICD-10-CM

## 2022-01-18 PROCEDURE — 99396 PR PREVENTIVE VISIT,EST,40-64: ICD-10-PCS | Mod: S$GLB,,, | Performed by: OBSTETRICS & GYNECOLOGY

## 2022-01-18 PROCEDURE — 99396 PREV VISIT EST AGE 40-64: CPT | Mod: S$GLB,,, | Performed by: OBSTETRICS & GYNECOLOGY

## 2022-01-18 NOTE — PROGRESS NOTES
CC: WELL WOMAN (age 45-59)-perimenopausal  Patient Care Team:  Taina Jackson NP as PCP - General (Family Medicine)    The patient's last visit with me was on 10/6/2020 for wwe    HPI:  Patient is a 46 y.o. who presents for her well woman exam today.  History reviewed with patient.   Patient is without complaints or concerns today.   Feeling occasional night sweats and random hot flashes. Periods still coming q mo but more irregular- normally light, once lasted 11 days    Patient's last menstrual period was 2021.       CONTRACEPTION: vasectomy    REVIEW OF PRIOR DATA/ HEALTH MAINTENANCE:  LAST ANNUAL/ PAP:   2020   LAST MMG (screening)- 2021- normal at [facility]   LAST LABS- in the last few months with pcp           Past Medical History:   Diagnosis Date    Anxiety and depression     Atrophic vaginitis     Hypothyroidism     Insomnia     Migraines     Seasonal allergies     Tachycardia      SURGICAL HX:   has no past surgical history on file.    SOCIAL HX:    reports that she has never smoked. She has never used smokeless tobacco. She reports current alcohol use. She reports that she does not use drugs.    FAMILY HX:   family history includes Breast cancer in her other; Breast cancer (age of onset: 62) in her maternal aunt; Diabetes in her father; Heart disease in her paternal grandfather; Hypertension in her brother and father; Stroke in her maternal grandmother; Thyroid disease in her mother. .    ALLERGIES:  Patient has no known allergies.    Current Outpatient Medications:     amitriptyline (ELAVIL) 150 MG Tab, Take 1 tablet (150 mg total) by mouth every evening., Disp: 90 tablet, Rfl: 3    butalbital-acetaminophen-caffeine -40 mg (FIORICET, ESGIC) -40 mg per tablet, Take 1 tablet by mouth every 4 (four) hours as needed for Headaches., Disp: 30 tablet, Rfl: 2    cetirizine (ZYRTEC) 10 MG tablet, Take 1 tab po q evening, Disp: 90 tablet, Rfl: 3    estradioL  "(ESTRACE) 0.01 % (0.1 mg/gram) vaginal cream, Twice a week, Disp: , Rfl:     fluticasone propionate (FLONASE) 50 mcg/actuation nasal spray, 2 sprays (100 mcg total) by Each Nostril route 2 (two) times a day., Disp: 15.8 g, Rfl: 0    levothyroxine (SYNTHROID) 50 MCG tablet, Take 1 tablet (50 mcg total) by mouth before breakfast., Disp: 90 tablet, Rfl: 3    venlafaxine (EFFEXOR-XR) 150 MG Cp24, Take 1 capsule (150 mg total) by mouth once daily., Disp: 90 capsule, Rfl: 3    ROS:  CONST:  No fever, chills, fatigue or unexpected changes in weight  CV: No chest pain or palpitations  RESP:  No shortness of breath or cough  GI: No abd pain, vomiting, diarrhea, blood in stool, or changes in bowel mvmts  SKIN: No rashes or lesions  MUSCULOSKELETAL: No joint swelling or pain  PSYCH: No changes in mood or insomia  BREASTS: No asymmetry, lumps, pain, nipple discharge, or skin changes  :  No dysuria, urgency, frequency, hematuria or incontinence          No vag dc, itching, odor or dryness          No pelvic pain, dyspareunia, or abnormal vaginal bleeding    VITALS:  Blood pressure (!) 147/82, pulse 92, height 5' 5" (1.651 m), weight 66.7 kg (147 lb), last menstrual period 12/31/2021.  Body mass index is 24.46 kg/m².     PHYSICAL EXAM-  APPEARANCE: Well appearing, in no acute distress.  NECK: Neck symmetric without masses or thyromegaly.  CV:  Normal rate  PULM: Normal resp rate, no resp distress, normal resp effort  PSYCH: Normal mood and affect, cooperative  SKIN: No rashes, lesions, or abnormal bruising  LYMPH: No inguinal or axillary adenopathy  ABD: Soft, without tenderness or masses. No palpable hernias or hsm  BREASTS: Symmetrical, no skin changes or lesions. No palpable masses, tenderness, or nipple dc  PELVIC:  VULVA: No lesions. Normal female genitalia.  URETHRAL MEATUS: No masses, no prolapse.  BLADDER/ URETHRA: No masses or suprapubic tenderness  VAGINA: No discharge, erythema, or lesions. No significant " cystocele or rectocele.   CVX: No lesions,no cervical motion tenderness.  UTERUS: Normal size/shape, mobile, non-tender  ADNEXA: No masses, tenderness, or fullness.  ANUS/ PERINEUM: Normal tone.  No lesions.  *female chaperone present for entire exam    ASSESSMENT and PLAN:  Encounter for well woman exam with routine gynecological exam  -     Mammo Digital Screening Bilat w/ Kin; Future; Expected date: 02/01/2022    Breast cancer screening by mammogram  -     Liquid-based pap smear, screening    Screening for colon cancer  -     Ambulatory referral/consult to Gastroenterology; Future; Expected date: 01/25/2022     FOLLOWUP:  1 year for wwe or sooner prn    COUNSELING:  Patient was counseled today on recommendation for yearly pelvic exam, current Pap guidelines, self breast exams, contraception, recommendations for annual screening mammograms, and routine screening colonoscopy at age 45.  Encouraged patient to see her PCP for other health maintenance.

## 2022-06-08 ENCOUNTER — PATIENT OUTREACH (OUTPATIENT)
Dept: ADMINISTRATIVE | Facility: HOSPITAL | Age: 47
End: 2022-06-08
Payer: OTHER GOVERNMENT

## 2022-06-08 NOTE — PROGRESS NOTES
Uploading and hyper-linking Mammogram done 1.19.2021 and HIV done 8.18.2015   No Colorectal screening found at this time.

## 2022-06-10 ENCOUNTER — OFFICE VISIT (OUTPATIENT)
Dept: FAMILY MEDICINE | Facility: CLINIC | Age: 47
End: 2022-06-10
Payer: OTHER GOVERNMENT

## 2022-06-10 VITALS
TEMPERATURE: 98 F | RESPIRATION RATE: 18 BRPM | OXYGEN SATURATION: 98 % | SYSTOLIC BLOOD PRESSURE: 134 MMHG | BODY MASS INDEX: 24.32 KG/M2 | HEIGHT: 65 IN | DIASTOLIC BLOOD PRESSURE: 84 MMHG | WEIGHT: 146 LBS | HEART RATE: 92 BPM

## 2022-06-10 DIAGNOSIS — E03.9 ACQUIRED HYPOTHYROIDISM: Primary | Chronic | ICD-10-CM

## 2022-06-10 DIAGNOSIS — G43.719 INTRACTABLE CHRONIC MIGRAINE WITHOUT AURA AND WITHOUT STATUS MIGRAINOSUS: Chronic | ICD-10-CM

## 2022-06-10 DIAGNOSIS — Z12.11 COLON CANCER SCREENING: ICD-10-CM

## 2022-06-10 DIAGNOSIS — J30.2 SEASONAL ALLERGIES: ICD-10-CM

## 2022-06-10 DIAGNOSIS — F41.9 ANXIETY: Chronic | ICD-10-CM

## 2022-06-10 DIAGNOSIS — G47.00 INSOMNIA, UNSPECIFIED TYPE: Chronic | ICD-10-CM

## 2022-06-10 PROCEDURE — 99214 PR OFFICE/OUTPT VISIT, EST, LEVL IV, 30-39 MIN: ICD-10-PCS | Mod: S$GLB,,, | Performed by: NURSE PRACTITIONER

## 2022-06-10 PROCEDURE — 99214 OFFICE O/P EST MOD 30 MIN: CPT | Mod: S$GLB,,, | Performed by: NURSE PRACTITIONER

## 2022-06-10 RX ORDER — BUTALBITAL, ACETAMINOPHEN AND CAFFEINE 50; 325; 40 MG/1; MG/1; MG/1
1 TABLET ORAL EVERY 4 HOURS PRN
Qty: 30 TABLET | Refills: 2 | Status: SHIPPED | OUTPATIENT
Start: 2022-06-10 | End: 2023-05-24

## 2022-06-10 RX ORDER — AMITRIPTYLINE HYDROCHLORIDE 150 MG/1
150 TABLET ORAL NIGHTLY
Qty: 90 TABLET | Refills: 3 | Status: SHIPPED | OUTPATIENT
Start: 2022-06-10 | End: 2023-06-07

## 2022-06-10 RX ORDER — LEVOTHYROXINE SODIUM 50 UG/1
50 TABLET ORAL
Qty: 30 TABLET | Refills: 0 | Status: SHIPPED | OUTPATIENT
Start: 2022-06-10 | End: 2022-07-14

## 2022-06-10 RX ORDER — VENLAFAXINE HYDROCHLORIDE 150 MG/1
150 CAPSULE, EXTENDED RELEASE ORAL DAILY
Qty: 90 CAPSULE | Refills: 3 | Status: SHIPPED | OUTPATIENT
Start: 2022-06-10 | End: 2023-05-23

## 2022-06-10 RX ORDER — FLUTICASONE PROPIONATE 50 MCG
2 SPRAY, SUSPENSION (ML) NASAL 2 TIMES DAILY
Qty: 15.8 G | Refills: 3 | Status: SHIPPED | OUTPATIENT
Start: 2022-06-10 | End: 2023-06-05

## 2022-06-10 RX ORDER — CETIRIZINE HYDROCHLORIDE 10 MG/1
TABLET ORAL
Qty: 90 TABLET | Refills: 3 | Status: SHIPPED | OUTPATIENT
Start: 2022-06-10 | End: 2023-05-23

## 2022-06-10 RX ORDER — ESTRADIOL 0.1 MG/G
1 CREAM VAGINAL
Qty: 42.5 G | Refills: 0 | Status: SHIPPED | OUTPATIENT
Start: 2022-06-13 | End: 2023-02-22

## 2022-06-10 NOTE — PROGRESS NOTES
Subjective:       Patient ID: Laura Middleton is a 46 y.o. female.    Chief Complaint: Follow-up (Pt is here for a yearly check up and medication refills. Pt states she has been having acid reflux and the OTC medication she takes aren't helping)    C/o active GERD. Current OTC antacid not helping w/ reflux symptoms.     Anxiety/Depression     Quality: symptoms worse during the day  Severity: denies suicidal ideations; able to maintain relationships; interference with household activities  Duration: symptoms lasting over 1 year   Onset/Timing: gradual; still present--improving   Context: work stress, has tremor in hands when anxious  Modifying Factors: social support; medications as directed; medication subtherapeutic  Associated Symptoms: denies homicidal ideations; no significant weight gain; no significant weight loss; no visual/auditory hallucinations; no delusions; no shortness of breath; no panic; no isolation; appetite good; energy good; no apathy; maintaining functionality; + anxiety; + insomnia--better with medication    Thyroid     Quality: waxes and wanes  Severity: moderate   Duration: constant    Onset/Timing: still present   Context: high thyroid levels  Modifying Factors: medication   Associated Symptoms: no cold intolerance; no heat intolerance; no weight loss; no weight gain; no double vision; no dry eyes; no hoarseness; no difficulty swallowing; no neck masses; no deepening of the voice; no fast heart rate; no increased blood pressure; no palpitations; no chest pain; no chest tightness or pressure; no constipation; no diarrhea; no vomiting; no decreased appetite; no loose stools; no irregular menstrual periods; no excessive sweating; no numbness; no tingling of the hands or feet; no dry skin; no hair loss; no tremor; no anxiety; no insomnia; no depression; no fatigue; joint pain      Review of Systems        Past Medical History:  Past Medical History:   Diagnosis Date    Anxiety and depression      Atrophic vaginitis     Hypothyroidism     Insomnia     Migraines     Seasonal allergies     Tachycardia       No past surgical history on file.   Review of patient's allergies indicates:  No Known Allergies   Current Outpatient Medications   Medication Sig Dispense Refill    amitriptyline (ELAVIL) 150 MG Tab Take 1 tablet (150 mg total) by mouth every evening. 90 tablet 3    butalbital-acetaminophen-caffeine -40 mg (FIORICET, ESGIC) -40 mg per tablet Take 1 tablet by mouth every 4 (four) hours as needed for Headaches. 30 tablet 2    cetirizine (ZYRTEC) 10 MG tablet Take 1 tab po q evening 90 tablet 3    estradioL (ESTRACE) 0.01 % (0.1 mg/gram) vaginal cream Place 1 g vaginally twice a week. Twice a week 42.5 g 0    fluticasone propionate (FLONASE) 50 mcg/actuation nasal spray 2 sprays (100 mcg total) by Each Nostril route 2 (two) times a day. 15.8 g 3    levothyroxine (SYNTHROID) 50 MCG tablet Take 1 tablet (50 mcg total) by mouth before breakfast. 30 tablet 0    venlafaxine (EFFEXOR-XR) 150 MG Cp24 Take 1 capsule (150 mg total) by mouth once daily. 90 capsule 3     No current facility-administered medications for this visit.     Social History     Socioeconomic History    Marital status:    Occupational History    Occupation: Radiology tech   Tobacco Use    Smoking status: Never Smoker    Smokeless tobacco: Never Used   Substance and Sexual Activity    Alcohol use: Yes    Drug use: Never    Sexual activity: Yes     Partners: Male     Birth control/protection: Partner-Vasectomy      Family History   Problem Relation Age of Onset    Thyroid disease Mother     Diabetes Father     Hypertension Father     Hypertension Brother     Stroke Maternal Grandmother     Heart disease Paternal Grandfather     Breast cancer Maternal Aunt 62    Breast cancer Other         PGaunt        Objective:      Physical Exam    Assessment:     1. Acquired hypothyroidism Active   2. Anxiety  Adjusting regimen per notes   3. Intractable chronic migraine without aura and without status migrainosus Stable   4. Insomnia, unspecified type Continue current regimen   5. Intractable chronic migraine without aura and without status migrainosus Active   6. Seasonal allergies Active   7. Colon cancer screening      Plan:       PROBLEM LIST     Acquired hypothyroidism  Comments:  obtaining thyroid panel; will send levothyroxine to [facility] once labs are completed.   Orders:  -     levothyroxine (SYNTHROID) 50 MCG tablet; Take 1 tablet (50 mcg total) by mouth before breakfast.  Dispense: 30 tablet; Refill: 0  -     CBC Auto Differential; Future; Expected date: 06/10/2022  -     Comprehensive Metabolic Panel; Future; Expected date: 06/10/2022  -     Lipid Panel; Future; Expected date: 06/10/2022  -     TSH w/reflex to FT4; Future; Expected date: 06/10/2022    Anxiety  Comments:  active, worsening; Venlafaxine increased.     Orders:  -     amitriptyline (ELAVIL) 150 MG Tab; Take 1 tablet (150 mg total) by mouth every evening.  Dispense: 90 tablet; Refill: 3  -     venlafaxine (EFFEXOR-XR) 150 MG Cp24; Take 1 capsule (150 mg total) by mouth once daily.  Dispense: 90 capsule; Refill: 3    Intractable chronic migraine without aura and without status migrainosus  Comments:  improved w/ amitriptyline  Orders:  -     amitriptyline (ELAVIL) 150 MG Tab; Take 1 tablet (150 mg total) by mouth every evening.  Dispense: 90 tablet; Refill: 3  -     butalbital-acetaminophen-caffeine -40 mg (FIORICET, ESGIC) -40 mg per tablet; Take 1 tablet by mouth every 4 (four) hours as needed for Headaches.  Dispense: 30 tablet; Refill: 2    Insomnia, unspecified type  Comments:  controlled on amitriptyline  Orders:  -     amitriptyline (ELAVIL) 150 MG Tab; Take 1 tablet (150 mg total) by mouth every evening.  Dispense: 90 tablet; Refill: 3    Intractable chronic migraine without aura and without status  migrainosus  Comments:  renewing [facility]  Orders:  -     amitriptyline (ELAVIL) 150 MG Tab; Take 1 tablet (150 mg total) by mouth every evening.  Dispense: 90 tablet; Refill: 3  -     butalbital-acetaminophen-caffeine -40 mg (FIORICET, ESGIC) -40 mg per tablet; Take 1 tablet by mouth every 4 (four) hours as needed for Headaches.  Dispense: 30 tablet; Refill: 2    Seasonal allergies  Comments:  flonase refilled  Orders:  -     cetirizine (ZYRTEC) 10 MG tablet; Take 1 tab po q evening  Dispense: 90 tablet; Refill: 3  -     fluticasone propionate (FLONASE) 50 mcg/actuation nasal spray; 2 sprays (100 mcg total) by Each Nostril route 2 (two) times a day.  Dispense: 15.8 g; Refill: 3    Colon cancer screening  -     Cologuard Screening (Multitarget Stool DNA); Future; Expected date: 06/10/2022    Other orders  -     estradioL (ESTRACE) 0.01 % (0.1 mg/gram) vaginal cream; Place 1 g vaginally twice a week. Twice a week  Dispense: 42.5 g; Refill: 0

## 2022-07-12 LAB
ABS NRBC COUNT: 0 X 10 3/UL (ref 0–0.01)
ABSOLUTE BASOPHIL: 0.03 X 10 3/UL (ref 0–0.22)
ABSOLUTE EOSINOPHIL: 0.05 X 10 3/UL (ref 0.04–0.54)
ABSOLUTE IMMATURE GRAN: 0.01 X 10 3/UL (ref 0–0.04)
ABSOLUTE LYMPHOCYTE: 1.64 X 10 3/UL (ref 0.86–4.75)
ABSOLUTE MONOCYTE: 0.4 X 10 3/UL (ref 0.22–1.08)
ALBUMIN SERPL-MCNC: 4.4 G/DL (ref 3.5–5.2)
ALBUMIN/GLOB SERPL ELPH: 2.2 {RATIO} (ref 1–2.7)
ALP ISOS SERPL LEV INH-CCNC: 51 U/L (ref 35–105)
ALT (SGPT): 11 U/L (ref 0–33)
ANION GAP SERPL CALC-SCNC: 10 MMOL/L (ref 8–17)
AST SERPL-CCNC: 12 U/L (ref 0–32)
BASOPHILS NFR BLD: 0.6 % (ref 0.2–1.2)
BILIRUBIN, TOTAL: 0.19 MG/DL (ref 0–1.2)
BUN/CREAT SERPL: 16.9 (ref 6–20)
CALCIUM SERPL-MCNC: 8.7 MG/DL (ref 8.6–10.2)
CARBON DIOXIDE, CO2: 25 MMOL/L (ref 22–29)
CHLORIDE: 103 MMOL/L (ref 98–107)
CHOLEST SERPL-MSCNC: 194 MG/DL (ref 100–200)
CREAT SERPL-MCNC: 0.7 MG/DL (ref 0.5–0.9)
EOSINOPHIL NFR BLD: 1 % (ref 0.7–7)
GFR ESTIMATION: 90.09
GLOBULIN: 2 G/DL (ref 1.5–4.5)
GLUCOSE: 104 MG/DL (ref 74–106)
HCT VFR BLD AUTO: 37.2 % (ref 37–47)
HDLC SERPL-MCNC: 74 MG/DL
HGB BLD-MCNC: 12.2 G/DL (ref 12–16)
IMMATURE GRANULOCYTES: 0.2 % (ref 0–0.5)
LDL/HDL RATIO: 1.3 (ref 1–3)
LDLC SERPL CALC-MCNC: 95 MG/DL (ref 0–100)
LYMPHOCYTES NFR BLD: 33.8 % (ref 19.3–53.1)
MCH RBC QN AUTO: 30.1 PG (ref 27–32)
MCHC RBC AUTO-ENTMCNC: 32.8 G/DL (ref 32–36)
MCV RBC AUTO: 91.9 FL (ref 82–100)
MONOCYTES NFR BLD: 8.2 % (ref 4.7–12.5)
NEUTROPHILS # BLD AUTO: 2.72 X 10 3/UL (ref 2.15–7.56)
NEUTROPHILS NFR BLD: 56.2 % (ref 34–71.1)
NUCLEATED RED BLOOD CELLS: 0 /100 WBC (ref 0–0.2)
PLATELET # BLD AUTO: 231 X 10 3/UL (ref 135–400)
POTASSIUM: 4.1 MMOL/L (ref 3.5–5.1)
PROT SNV-MCNC: 6.4 G/DL (ref 6.4–8.3)
RBC # BLD AUTO: 4.05 X 10 6/UL (ref 4.2–5.4)
RDW-SD: 47.5 FL (ref 37–54)
SODIUM: 138 MMOL/L (ref 136–145)
T4, FREE: 0.9 NG/DL (ref 0.93–1.7)
TRIGL SERPL-MCNC: 125 MG/DL (ref 0–150)
TSH W/REFLEX TO FT4: 9.56 UIU/ML (ref 0.27–4.2)
UREA NITROGEN (BUN): 11.8 MG/DL (ref 6–20)
WBC # BLD: 4.85 X 10 3/UL (ref 4.3–10.8)

## 2022-07-14 ENCOUNTER — TELEPHONE (OUTPATIENT)
Dept: FAMILY MEDICINE | Facility: CLINIC | Age: 47
End: 2022-07-14
Payer: OTHER GOVERNMENT

## 2022-07-14 DIAGNOSIS — E03.9 ACQUIRED HYPOTHYROIDISM: Primary | ICD-10-CM

## 2022-07-14 RX ORDER — LEVOTHYROXINE SODIUM 75 UG/1
75 TABLET ORAL
Qty: 90 TABLET | Refills: 3 | Status: SHIPPED | OUTPATIENT
Start: 2022-07-14 | End: 2023-09-26 | Stop reason: ALTCHOICE

## 2022-07-14 NOTE — TELEPHONE ENCOUNTER
----- Message from Taina Jackson NP sent at 7/14/2022  2:05 PM CDT -----  Please tell her I need to increase her levothyroxine based on the results of her thyroid panel. Everything else looks okay.

## 2022-11-16 ENCOUNTER — PATIENT MESSAGE (OUTPATIENT)
Dept: ADMINISTRATIVE | Facility: HOSPITAL | Age: 47
End: 2022-11-16
Payer: COMMERCIAL

## 2023-01-16 ENCOUNTER — PATIENT MESSAGE (OUTPATIENT)
Dept: ADMINISTRATIVE | Facility: HOSPITAL | Age: 48
End: 2023-01-16
Payer: COMMERCIAL

## 2023-01-20 ENCOUNTER — TELEPHONE (OUTPATIENT)
Dept: OBSTETRICS AND GYNECOLOGY | Facility: CLINIC | Age: 48
End: 2023-01-20

## 2023-01-20 ENCOUNTER — OFFICE VISIT (OUTPATIENT)
Dept: OBSTETRICS AND GYNECOLOGY | Facility: CLINIC | Age: 48
End: 2023-01-20
Payer: COMMERCIAL

## 2023-01-20 VITALS
WEIGHT: 150.81 LBS | BODY MASS INDEX: 25.13 KG/M2 | SYSTOLIC BLOOD PRESSURE: 137 MMHG | DIASTOLIC BLOOD PRESSURE: 82 MMHG | HEIGHT: 65 IN | HEART RATE: 97 BPM

## 2023-01-20 DIAGNOSIS — E03.8 OTHER SPECIFIED HYPOTHYROIDISM: ICD-10-CM

## 2023-01-20 DIAGNOSIS — Z12.31 BREAST CANCER SCREENING BY MAMMOGRAM: ICD-10-CM

## 2023-01-20 DIAGNOSIS — N92.6 MENSES, IRREGULAR: ICD-10-CM

## 2023-01-20 DIAGNOSIS — Z01.419 ENCOUNTER FOR WELL WOMAN EXAM WITH ROUTINE GYNECOLOGICAL EXAM: Primary | ICD-10-CM

## 2023-01-20 LAB
FSH: 13.6 MIU/ML
NONINV COLON CA DNA+OCC BLD SCRN STL QL: NEGATIVE
T4, FREE: 1.08 NG/DL (ref 0.93–1.7)
TSH SERPL DL<=0.005 MIU/L-ACNC: 4.27 UIU/ML (ref 0.27–4.2)

## 2023-01-20 PROCEDURE — 99396 PREV VISIT EST AGE 40-64: CPT | Mod: S$GLB,,, | Performed by: OBSTETRICS & GYNECOLOGY

## 2023-01-20 PROCEDURE — 99396 PR PREVENTIVE VISIT,EST,40-64: ICD-10-PCS | Mod: S$GLB,,, | Performed by: OBSTETRICS & GYNECOLOGY

## 2023-01-20 NOTE — TELEPHONE ENCOUNTER
----- Message from Lynette Prince MD sent at 1/20/2023  1:01 PM CST -----  Please let pt know she is not yet close to menopause but also that her thyroid is not quite where it needs to be yet, so I would recommend getting in touch with her pcp to get that synthroid increased some more and she will likely feel better.  She will probably need to repeat those levels after about 3 months on the new dose. Her pcp should be able to see these labs but she still needs to contact her pcp to let her know that she did them

## 2023-01-20 NOTE — PROGRESS NOTES
Please let pt know she is not yet close to menopause but also that her thyroid is not quite where it needs to be yet, so I would recommend getting in touch with her pcp to get that synthroid increased some more and she will likely feel better.  She will probably need to repeat those levels after about 3 months on the new dose. Her pcp should be able to see these labs but she still needs to contact her pcp to let her know that she did them
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
## 2023-01-20 NOTE — TELEPHONE ENCOUNTER
Spoke with patient. Two pt identifiers confirmed. Notified patient of her lab results. Patient verbalized understanding.

## 2023-01-25 ENCOUNTER — PATIENT MESSAGE (OUTPATIENT)
Dept: FAMILY MEDICINE | Facility: CLINIC | Age: 48
End: 2023-01-25
Payer: COMMERCIAL

## 2023-01-26 LAB — Lab: NORMAL

## 2023-04-16 DIAGNOSIS — R92.8 ABNORMAL SCREENING MAMMOGRAM: Primary | ICD-10-CM

## 2023-05-23 DIAGNOSIS — F41.9 ANXIETY: Chronic | ICD-10-CM

## 2023-05-23 DIAGNOSIS — J30.2 SEASONAL ALLERGIES: ICD-10-CM

## 2023-05-23 DIAGNOSIS — G43.719 INTRACTABLE CHRONIC MIGRAINE WITHOUT AURA AND WITHOUT STATUS MIGRAINOSUS: Chronic | ICD-10-CM

## 2023-05-23 RX ORDER — CETIRIZINE HYDROCHLORIDE 10 MG/1
TABLET ORAL
Qty: 30 TABLET | Refills: 0 | Status: SHIPPED | OUTPATIENT
Start: 2023-05-23 | End: 2023-08-01 | Stop reason: SDUPTHER

## 2023-05-23 RX ORDER — VENLAFAXINE HYDROCHLORIDE 150 MG/1
CAPSULE, EXTENDED RELEASE ORAL
Qty: 30 CAPSULE | Refills: 0 | Status: SHIPPED | OUTPATIENT
Start: 2023-05-23 | End: 2023-08-01 | Stop reason: SDUPTHER

## 2023-05-24 RX ORDER — BUTALBITAL, ACETAMINOPHEN AND CAFFEINE 50; 325; 40 MG/1; MG/1; MG/1
TABLET ORAL
Qty: 30 TABLET | Refills: 2 | Status: SHIPPED | OUTPATIENT
Start: 2023-05-24 | End: 2023-09-26

## 2023-06-05 DIAGNOSIS — J30.2 SEASONAL ALLERGIES: ICD-10-CM

## 2023-06-05 RX ORDER — FLUTICASONE PROPIONATE 50 MCG
SPRAY, SUSPENSION (ML) NASAL
Qty: 96 G | Refills: 3 | Status: SHIPPED | OUTPATIENT
Start: 2023-06-05

## 2023-06-07 DIAGNOSIS — G47.00 INSOMNIA, UNSPECIFIED TYPE: Chronic | ICD-10-CM

## 2023-06-07 DIAGNOSIS — G43.719 INTRACTABLE CHRONIC MIGRAINE WITHOUT AURA AND WITHOUT STATUS MIGRAINOSUS: Chronic | ICD-10-CM

## 2023-06-07 DIAGNOSIS — F41.9 ANXIETY: Chronic | ICD-10-CM

## 2023-06-07 RX ORDER — AMITRIPTYLINE HYDROCHLORIDE 150 MG/1
TABLET ORAL
Qty: 90 TABLET | Refills: 3 | Status: SHIPPED | OUTPATIENT
Start: 2023-06-07

## 2023-08-01 ENCOUNTER — TELEPHONE (OUTPATIENT)
Dept: FAMILY MEDICINE | Facility: CLINIC | Age: 48
End: 2023-08-01

## 2023-08-01 DIAGNOSIS — J30.2 SEASONAL ALLERGIES: ICD-10-CM

## 2023-08-01 DIAGNOSIS — F41.9 ANXIETY: Chronic | ICD-10-CM

## 2023-08-02 RX ORDER — CETIRIZINE HYDROCHLORIDE 10 MG/1
TABLET ORAL
Qty: 90 TABLET | Refills: 1 | Status: SHIPPED | OUTPATIENT
Start: 2023-08-02 | End: 2024-01-17

## 2023-08-02 RX ORDER — VENLAFAXINE HYDROCHLORIDE 150 MG/1
150 CAPSULE, EXTENDED RELEASE ORAL DAILY
Qty: 90 CAPSULE | Refills: 1 | Status: SHIPPED | OUTPATIENT
Start: 2023-08-02 | End: 2024-01-17

## 2023-09-26 ENCOUNTER — OFFICE VISIT (OUTPATIENT)
Dept: FAMILY MEDICINE | Facility: CLINIC | Age: 48
End: 2023-09-26
Payer: OTHER GOVERNMENT

## 2023-09-26 VITALS
SYSTOLIC BLOOD PRESSURE: 112 MMHG | HEIGHT: 65 IN | HEART RATE: 89 BPM | OXYGEN SATURATION: 97 % | RESPIRATION RATE: 18 BRPM | DIASTOLIC BLOOD PRESSURE: 75 MMHG | BODY MASS INDEX: 24.32 KG/M2 | WEIGHT: 146 LBS

## 2023-09-26 DIAGNOSIS — R11.2 NAUSEA AND VOMITING, UNSPECIFIED VOMITING TYPE: ICD-10-CM

## 2023-09-26 DIAGNOSIS — G43.909 ACUTE MIGRAINE: ICD-10-CM

## 2023-09-26 DIAGNOSIS — Z00.00 ROUTINE ADULT HEALTH MAINTENANCE: Primary | ICD-10-CM

## 2023-09-26 LAB
ABS NRBC COUNT: 0 THOU/UL (ref 0–0.01)
ABSOLUTE BASOPHIL: 0 10*3/UL (ref 0–0.3)
ABSOLUTE EOSINOPHIL: 0 10*3/UL (ref 0–0.6)
ABSOLUTE IMMATURE GRAN: 0.01 THOU/UL (ref 0–0.03)
ABSOLUTE LYMPHOCYTE: 1.4 10*3/UL (ref 1.2–4)
ABSOLUTE MONOCYTE: 0.3 10*3/UL (ref 0.1–0.8)
ALBUMIN SERPL BCP-MCNC: 3.8 G/DL (ref 3.4–5)
ALP SERPL-CCNC: 61 U/L (ref 45–117)
ALT SERPL W P-5'-P-CCNC: 31 U/L (ref 13–56)
ANION GAP SERPL CALC-SCNC: 3 MMOL/L (ref 3–11)
AST SERPL-CCNC: 13 U/L (ref 15–37)
BASOPHILS NFR BLD: 0.4 % (ref 0–3)
BILIRUB SERPL-MCNC: 0.2 MG/DL (ref 0.2–1)
BUN SERPL-MCNC: 7 MG/DL (ref 7–18)
BUN/CREAT SERPL: 10.76 RATIO
CALCIUM SERPL-MCNC: 8.8 MG/DL (ref 8.5–10.1)
CHLORIDE SERPL-SCNC: 105 MMOL/L (ref 98–107)
CHOLEST SERPL-MSCNC: 203 MG/DL
CO2 SERPL-SCNC: 28 MMOL/L (ref 21–32)
CREAT SERPL-MCNC: 0.65 MG/DL (ref 0.55–1.02)
EOSINOPHIL NFR BLD: 0.8 % (ref 0–6)
ERYTHROCYTE [DISTWIDTH] IN BLOOD BY AUTOMATED COUNT: 12.8 % (ref 0–15.5)
GFR ESTIMATION: > 60
GLUCOSE SERPL-MCNC: 95 MG/DL (ref 74–106)
HCT VFR BLD AUTO: 40 % (ref 37–47)
HDLC SERPL-MCNC: 62 MG/DL
HGB BLD-MCNC: 12.7 G/DL (ref 12–16)
IMMATURE GRANULOCYTES: 0.2 % (ref 0–0.43)
LDLC SERPL CALC-MCNC: 106.2 MG/DL
LYMPHOCYTES NFR BLD: 29.8 % (ref 20–45)
MCH RBC QN AUTO: 29 PG (ref 27–32)
MCHC RBC AUTO-ENTMCNC: 31.8 % (ref 32–36)
MCV RBC AUTO: 91.3 FL (ref 80–99)
MONOCYTES NFR BLD: 6.7 % (ref 2–10)
NEUTROPHILS # BLD AUTO: 3 10*3/UL (ref 1.4–7)
NEUTROPHILS NFR BLD: 62.1 % (ref 50–80)
NUCLEATED RED BLOOD CELLS: 0 % (ref 0–0.2)
PLATELETS: 238 10*3/UL (ref 130–400)
PMV BLD AUTO: 10.5 FL (ref 9.2–12.2)
POTASSIUM SERPL-SCNC: 4.1 MMOL/L (ref 3.5–5.1)
PROT SERPL-MCNC: 7.1 G/DL (ref 6.4–8.2)
RBC # BLD AUTO: 4.38 10*6/UL (ref 4.2–5.4)
SODIUM BLD-SCNC: 136 MMOL/L (ref 131–143)
TRIGL SERPL-MCNC: 174 MG/DL (ref 0–149)
VLDL CHOLESTEROL: 35 MG/DL
WBC # BLD: 4.8 10*3/UL (ref 4.5–10)

## 2023-09-26 PROCEDURE — 99396 PR PREVENTIVE VISIT,EST,40-64: ICD-10-PCS | Mod: S$GLB,,, | Performed by: NURSE PRACTITIONER

## 2023-09-26 PROCEDURE — 99396 PREV VISIT EST AGE 40-64: CPT | Mod: S$GLB,,, | Performed by: NURSE PRACTITIONER

## 2023-09-26 RX ORDER — ONDANSETRON 4 MG/1
8 TABLET, ORALLY DISINTEGRATING ORAL 2 TIMES DAILY PRN
Qty: 60 TABLET | Refills: 3 | Status: SHIPPED | OUTPATIENT
Start: 2023-09-26

## 2023-09-26 RX ORDER — LEVOTHYROXINE, LIOTHYRONINE 76; 18 UG/1; UG/1
1 TABLET ORAL DAILY
COMMUNITY
Start: 2023-07-19

## 2023-09-26 RX ORDER — RIMEGEPANT SULFATE 75 MG/75MG
TABLET, ORALLY DISINTEGRATING ORAL
Qty: 16 TABLET | Refills: 2 | Status: SHIPPED | OUTPATIENT
Start: 2023-09-26

## 2023-09-26 NOTE — PROGRESS NOTES
Subjective:       Patient ID: Laura Middleton is a 48 y.o. female.    Chief Complaint: Annual Exam (Pt is here for an annual check up and medication refills.)    She is here for annual wellness visit. She is UTD routine pap surveillance. She is up to date with Tdap vaccine. Declining flu vaccine of covid booster. Due for annual wellness labs, will obtain today. Thyroid in managed by her integrative medicine provider. UTD with colorectal screen--Cologuard 1/13/23 negative. Her last mammogram 5/15/23 BI-RADS cat 1.    More headaches of late. Recently quit her job in July and changed to a prn position. She believes her job was a contributing factor.       Review of Systems   Constitutional:  Negative for appetite change and fever.   Respiratory:  Negative for chest tightness and shortness of breath.    Cardiovascular:  Negative for chest pain and palpitations.   Gastrointestinal:  Negative for abdominal pain, nausea and vomiting.   Skin:  Negative for color change and rash.   Neurological:  Positive for headaches. Negative for dizziness and light-headedness.   Psychiatric/Behavioral:  Negative for dysphoric mood and sleep disturbance. The patient is not nervous/anxious.            Past Medical History:  Past Medical History:   Diagnosis Date    Anxiety and depression     Atrophic vaginitis     Hypothyroidism     Insomnia     Migraines     Seasonal allergies     Tachycardia       History reviewed. No pertinent surgical history.   Review of patient's allergies indicates:  No Known Allergies   Current Outpatient Medications   Medication Sig Dispense Refill    amitriptyline (ELAVIL) 150 MG Tab TAKE 1 TABLET EVERY EVENING 90 tablet 3    cetirizine (ZYRTEC) 10 MG tablet TAKE 1 TABLET EVERY EVENING 90 tablet 1    estradioL (ESTRACE) 0.01 % (0.1 mg/gram) vaginal cream INSERT 1 GRAM VAGINALLY TWICE A WEEK 42.5 g 2    fluticasone propionate (FLONASE) 50 mcg/actuation nasal spray USE 2 SPRAYS IN EACH NOSTRIL TWICE A DAY 96 g  3    NP THYROID 120 mg Tab Take 1 tablet by mouth Daily.      venlafaxine (EFFEXOR-XR) 150 MG Cp24 Take 1 capsule (150 mg total) by mouth once daily. 90 capsule 1    ondansetron (ZOFRAN-ODT) 4 MG TbDL Take 2 tablets (8 mg total) by mouth 2 (two) times daily as needed (nausea/vomiting). 60 tablet 3    rimegepant (NURTEC) 75 mg odt Place ODT tablet on the tongue prn daily at onset of headache. Do not exceed more than 1 tablet daily. 16 tablet 2     No current facility-administered medications for this visit.     Social History     Socioeconomic History    Marital status:    Occupational History    Occupation: Radiology tech   Tobacco Use    Smoking status: Never    Smokeless tobacco: Never   Substance and Sexual Activity    Alcohol use: Yes    Drug use: Never    Sexual activity: Yes     Partners: Male     Birth control/protection: Partner-Vasectomy      Family History   Problem Relation Age of Onset    Thyroid disease Mother     Diabetes Father     Hypertension Father     Hypertension Brother     Stroke Maternal Grandmother     Heart disease Paternal Grandfather     Breast cancer Maternal Aunt 62    Breast cancer Other         PGaunt        Objective:      Physical Exam  Constitutional:       Appearance: She is well-developed.   HENT:      Head: Normocephalic and atraumatic.   Eyes:      General: No scleral icterus.     Conjunctiva/sclera: Conjunctivae normal.      Pupils: Pupils are equal, round, and reactive to light.   Neck:      Thyroid: No thyroid mass.      Trachea: Trachea normal.   Cardiovascular:      Rate and Rhythm: Normal rate and regular rhythm.   Pulmonary:      Effort: Pulmonary effort is normal.      Breath sounds: Normal breath sounds.   Musculoskeletal:      Cervical back: Normal range of motion and neck supple.   Neurological:      Mental Status: She is alert and oriented to person, place, and time.   Psychiatric:         Mood and Affect: Mood normal.         Behavior: Behavior normal.          Assessment:     1. Routine adult health maintenance    2. Acute migraine    3. Nausea and vomiting, unspecified vomiting type      Plan:       PROBLEM LIST     Routine adult health maintenance  -     CBC Auto Differential  -     Comprehensive Metabolic Panel  -     Lipid Panel  -     Cancel: TSH+Free T4    Acute migraine  -     rimegepant (NURTEC) 75 mg odt; Place ODT tablet on the tongue prn daily at onset of headache. Do not exceed more than 1 tablet daily.  Dispense: 16 tablet; Refill: 2    Nausea and vomiting, unspecified vomiting type    Other orders  -     ondansetron (ZOFRAN-ODT) 4 MG TbDL; Take 2 tablets (8 mg total) by mouth 2 (two) times daily as needed (nausea/vomiting).  Dispense: 60 tablet; Refill: 3

## 2023-09-27 ENCOUNTER — PATIENT MESSAGE (OUTPATIENT)
Dept: FAMILY MEDICINE | Facility: CLINIC | Age: 48
End: 2023-09-27
Payer: OTHER GOVERNMENT

## 2024-01-04 ENCOUNTER — TELEPHONE (OUTPATIENT)
Dept: OBSTETRICS AND GYNECOLOGY | Facility: CLINIC | Age: 49
End: 2024-01-04
Payer: OTHER GOVERNMENT

## 2024-01-04 NOTE — TELEPHONE ENCOUNTER
----- Message from Nancy Copeland sent at 1/4/2024 11:23 AM CST -----  Contact: Patient  Patient initially called to cancel appointment scheduled for 1/26 at 9am , patient changed mind, but appt.was already cancelled. Please give patient a call to reschedule.

## 2024-01-04 NOTE — TELEPHONE ENCOUNTER
Returned call to patient. Rescheduled her appt for 1/26/24 at 9:00. Patient verballized understanding.

## 2024-01-17 DIAGNOSIS — F41.9 ANXIETY: Chronic | ICD-10-CM

## 2024-01-17 DIAGNOSIS — J30.2 SEASONAL ALLERGIES: ICD-10-CM

## 2024-01-17 RX ORDER — CETIRIZINE HYDROCHLORIDE 10 MG/1
TABLET ORAL
Qty: 90 TABLET | Refills: 3 | Status: SHIPPED | OUTPATIENT
Start: 2024-01-17

## 2024-01-17 RX ORDER — VENLAFAXINE HYDROCHLORIDE 150 MG/1
150 CAPSULE, EXTENDED RELEASE ORAL
Qty: 90 CAPSULE | Refills: 3 | Status: SHIPPED | OUTPATIENT
Start: 2024-01-17

## 2024-01-25 PROBLEM — J18.9 ATYPICAL PNEUMONIA: Status: ACTIVE | Noted: 2024-01-25

## 2024-01-25 RX ORDER — BUTALBITAL, ACETAMINOPHEN AND CAFFEINE 50; 325; 40 MG/1; MG/1; MG/1
1 TABLET ORAL
COMMUNITY
Start: 2023-12-06

## 2024-01-25 NOTE — PROGRESS NOTES
CC: WELL WOMAN (age 45-59)-perimenopausal  Patient Care Team:  Taina Jackson NP as PCP - General (Family Medicine)    HPI:  Patient is a 48 y.o. who presents for her well woman exam today.  History reviewed with patient.   Patient is without complaints or concerns today. Cycles more irregular- skipping a few months at a time- flow varies and length of cycles varies. Sometimes longer than normal but has extra days of light spotting at beginning or end of cycle.     Last visit with me was on  2023 for wwe    CONTRACEPTION: vasectomy  HX ABNL PAPS: none    REVIEW OF PRIOR DATA/ HEALTH MAINTENANCE:  LAST ANNUAL:   2023   LAST MMG (screening)- May 2023-  [facility]   LAST LABS- does with PCP   LAST COLOGARD-  - neg- Q 3 years     Past Medical History:   Diagnosis Date    Anxiety and depression     Atrophic vaginitis     Hypothyroidism     Insomnia     Migraines     Seasonal allergies     Tachycardia      SURGICAL HX:   has no past surgical history on file.    SOCIAL HX:    reports that she has never smoked. She has never used smokeless tobacco. She reports current alcohol use. She reports that she does not use drugs.    FAMILY HX:   family history includes Breast cancer in an other family member; Breast cancer (age of onset: 62) in her maternal aunt; Diabetes in her father; Heart disease in her paternal grandfather; Hypertension in her brother and father; Stroke in her maternal grandmother; Thyroid disease in her mother. .    ALLERGIES:  Patient has no known allergies.    Current Outpatient Medications   Medication Instructions    amitriptyline (ELAVIL) 150 MG Tab TAKE 1 TABLET EVERY EVENING    butalbital-acetaminophen-caffeine -40 mg (FIORICET, ESGIC) -40 mg per tablet 1 tablet, Oral    cetirizine (ZYRTEC) 10 MG tablet TAKE 1 TABLET EVERY EVENING    estradioL (ESTRACE) 0.01 % (0.1 mg/gram) vaginal cream INSERT 1 GRAM VAGINALLY TWICE A WEEK    fluticasone propionate (FLONASE) 50  "mcg/actuation nasal spray USE 2 SPRAYS IN EACH NOSTRIL TWICE A DAY    NP THYROID 120 mg Tab 1 tablet, Oral, Daily    ondansetron (ZOFRAN-ODT) 8 mg, Oral, 2 times daily PRN    rimegepant (NURTEC) 75 mg odt Place ODT tablet on the tongue prn daily at onset of headache. Do not exceed more than 1 tablet daily.    venlafaxine (EFFEXOR-XR) 150 mg, Oral     ROS:  CONST:  No fever, chills, fatigue or unexpected changes in weight   CV: No chest pain or palpitations   RESP:  No shortness of breath or cough   GI: No abd pain, vomiting, diarrhea, blood in stool, or changes in bowel mvmts   SKIN: No rashes or lesions  MUSCULOSKELETAL: No joint swelling or pain   PSYCH: No changes in mood or insomia   BREASTS: No asymmetry, lumps, pain, nipple discharge, or skin changes   :  No dysuria, urgency, frequency, hematuria or incontinence           No vag dc, itching, odor or dryness           No pelvic pain, dyspareunia, or abnormal vaginal bleeding     VITALS:  Blood pressure 117/80, height 5' 5" (1.651 m), weight 68.5 kg (151 lb), last menstrual period 12/25/2023.  Body mass index is 25.13 kg/m².     PHYSICAL EXAM-  APPEARANCE: Well appearing, in no acute distress.   NECK: Neck symmetric.   CV:  Normal rate   PULM: Normal resp rate, no resp distress, normal resp effort    PSYCH: Normal mood and affect, cooperative   SKIN: No rashes, lesions, or abnormal bruising   LYMPH: No inguinal or axillary adenopathy   ABD: Soft, without tenderness or masses.   BREAST: Symmetrical, no nipple changes, no skin changes, No palpable masses   PELVIC:  VULVA: No lesions. Normal female genitalia.  URETHRAL MEATUS: No masses, no significant prolapse.   BLADDER/ URETHRA: No masses or suprapubic tenderness   VAGINA: No lesions or erythema, No discharge.   CVX: No lesions,no cervical motion tenderness.   UTERUS: Normal size/shape, mobile, non-tender   ADNEXA: No masses, tenderness, or fullness.   ANUS/ PERINEUM: Normal tone.  No lesions.     *female " chaperone present for entire exam    ASSESSMENT and PLAN:  Encounter for well woman exam with routine gynecological exam  -     Liquid-based pap smear, screening    Breast cancer screening by mammogram  -     Mammo Digital Screening Bilat w/ Kin; Future; Expected date: 02/08/2024    Menses, irregular  -     Follicle Stimulating Hormone; Future    Atrophic vaginitis  -     estradioL (ESTRACE) 0.01 % (0.1 mg/gram) vaginal cream; INSERT 1 GRAM VAGINALLY TWICE A WEEK  Dispense: 42.5 g; Refill: 3       FOLLOWUP:  1 year for wwe or sooner prn    COUNSELING:  Patient was counseled today on recommendation for yearly pelvic exam, current Pap guidelines, self breast exams, contraception, recommendations for annual screening mammograms, and routine screening colonoscopy at age 45.  Encouraged patient to see her PCP for other health maintenance.

## 2024-01-26 ENCOUNTER — OFFICE VISIT (OUTPATIENT)
Dept: OBSTETRICS AND GYNECOLOGY | Facility: CLINIC | Age: 49
End: 2024-01-26
Payer: OTHER GOVERNMENT

## 2024-01-26 ENCOUNTER — PATIENT MESSAGE (OUTPATIENT)
Dept: OBSTETRICS AND GYNECOLOGY | Facility: CLINIC | Age: 49
End: 2024-01-26

## 2024-01-26 VITALS
DIASTOLIC BLOOD PRESSURE: 80 MMHG | WEIGHT: 151 LBS | SYSTOLIC BLOOD PRESSURE: 117 MMHG | BODY MASS INDEX: 25.16 KG/M2 | HEIGHT: 65 IN

## 2024-01-26 DIAGNOSIS — N95.2 ATROPHIC VAGINITIS: ICD-10-CM

## 2024-01-26 DIAGNOSIS — Z12.31 BREAST CANCER SCREENING BY MAMMOGRAM: ICD-10-CM

## 2024-01-26 DIAGNOSIS — Z01.419 ENCOUNTER FOR WELL WOMAN EXAM WITH ROUTINE GYNECOLOGICAL EXAM: Primary | ICD-10-CM

## 2024-01-26 DIAGNOSIS — N92.6 MENSES, IRREGULAR: ICD-10-CM

## 2024-01-26 LAB — FSH: 35.3 MIU/ML

## 2024-01-26 PROCEDURE — 99396 PREV VISIT EST AGE 40-64: CPT | Mod: S$GLB,,, | Performed by: OBSTETRICS & GYNECOLOGY

## 2024-01-26 RX ORDER — ESTRADIOL 0.1 MG/G
CREAM VAGINAL
Qty: 42.5 G | Refills: 3 | Status: SHIPPED | OUTPATIENT
Start: 2024-01-26

## 2024-01-26 NOTE — PROGRESS NOTES
Please let pt know her fsh is now up to 35 which is definitely perimenopausal range and much closer to menopausal than a yr ago. In general , we consider>40 to be the menopausal range but it can fluctuate a little, but she is definitely closer

## 2024-01-31 LAB — Lab: NORMAL

## 2024-06-03 DIAGNOSIS — F41.9 ANXIETY: Chronic | ICD-10-CM

## 2024-06-03 DIAGNOSIS — G47.00 INSOMNIA, UNSPECIFIED TYPE: Chronic | ICD-10-CM

## 2024-06-03 DIAGNOSIS — G43.719 INTRACTABLE CHRONIC MIGRAINE WITHOUT AURA AND WITHOUT STATUS MIGRAINOSUS: Chronic | ICD-10-CM

## 2024-06-04 RX ORDER — AMITRIPTYLINE HYDROCHLORIDE 150 MG/1
TABLET ORAL
Qty: 90 TABLET | Refills: 3 | Status: SHIPPED | OUTPATIENT
Start: 2024-06-04

## 2024-09-13 ENCOUNTER — PATIENT MESSAGE (OUTPATIENT)
Dept: PRIMARY CARE CLINIC | Facility: CLINIC | Age: 49
End: 2024-09-13
Payer: OTHER GOVERNMENT

## 2024-10-01 ENCOUNTER — OFFICE VISIT (OUTPATIENT)
Dept: FAMILY MEDICINE | Facility: CLINIC | Age: 49
End: 2024-10-01
Payer: OTHER GOVERNMENT

## 2024-10-01 VITALS
HEART RATE: 97 BPM | TEMPERATURE: 98 F | SYSTOLIC BLOOD PRESSURE: 124 MMHG | HEIGHT: 65 IN | BODY MASS INDEX: 25.33 KG/M2 | OXYGEN SATURATION: 98 % | WEIGHT: 152 LBS | RESPIRATION RATE: 16 BRPM | DIASTOLIC BLOOD PRESSURE: 62 MMHG

## 2024-10-01 DIAGNOSIS — Z00.00 ROUTINE ADULT HEALTH MAINTENANCE: Primary | ICD-10-CM

## 2024-10-01 DIAGNOSIS — E03.9 ACQUIRED HYPOTHYROIDISM: Chronic | ICD-10-CM

## 2024-10-01 DIAGNOSIS — R40.0 DAYTIME SOMNOLENCE: Chronic | ICD-10-CM

## 2024-10-01 DIAGNOSIS — R73.9 HYPERGLYCEMIA: ICD-10-CM

## 2024-10-01 DIAGNOSIS — F41.9 ANXIETY: Chronic | ICD-10-CM

## 2024-10-01 DIAGNOSIS — Z12.31 BREAST CANCER SCREENING BY MAMMOGRAM: ICD-10-CM

## 2024-10-01 DIAGNOSIS — G43.719 INTRACTABLE CHRONIC MIGRAINE WITHOUT AURA AND WITHOUT STATUS MIGRAINOSUS: Chronic | ICD-10-CM

## 2024-10-01 DIAGNOSIS — R06.83 SNORING: Chronic | ICD-10-CM

## 2024-10-01 DIAGNOSIS — R11.2 NAUSEA AND VOMITING, UNSPECIFIED VOMITING TYPE: ICD-10-CM

## 2024-10-01 LAB
ABS NRBC COUNT: 0 THOU/UL (ref 0–0.01)
ABSOLUTE BASOPHIL: 0 10*3/UL (ref 0–0.3)
ABSOLUTE EOSINOPHIL: 0 10*3/UL (ref 0–0.6)
ABSOLUTE IMMATURE GRAN: 0.02 THOU/UL (ref 0–0.03)
ABSOLUTE LYMPHOCYTE: 1.3 10*3/UL (ref 1.2–4)
ABSOLUTE MONOCYTE: 0.4 10*3/UL (ref 0.1–0.8)
ALBUMIN SERPL BCP-MCNC: 3.5 G/DL (ref 3.4–5)
ALBUMIN/GLOBULIN RATIO: 1.1 RATIO (ref 1.1–1.8)
ALP SERPL-CCNC: 63 U/L (ref 45–117)
ALT SERPL W P-5'-P-CCNC: 26 U/L (ref 13–56)
ANION GAP SERPL CALC-SCNC: 3 MMOL/L (ref 3–11)
AST SERPL-CCNC: 14 U/L (ref 15–37)
BASOPHILS NFR BLD: 0.5 % (ref 0–3)
BILIRUB SERPL-MCNC: 0.3 MG/DL (ref 0.2–1)
BUN SERPL-MCNC: 9 MG/DL (ref 7–18)
BUN/CREAT SERPL: 13.43 RATIO
CALCIUM SERPL-MCNC: 8.9 MG/DL (ref 8.5–10.1)
CHLORIDE SERPL-SCNC: 106 MMOL/L (ref 98–107)
CHOLEST SERPL-MSCNC: 188 MG/DL
CO2 SERPL-SCNC: 28 MMOL/L (ref 21–32)
CREAT SERPL-MCNC: 0.67 MG/DL (ref 0.55–1.02)
EOSINOPHIL NFR BLD: 0.9 % (ref 0–6)
ERYTHROCYTE [DISTWIDTH] IN BLOOD BY AUTOMATED COUNT: 13.2 % (ref 0–15.5)
ESTIMATED AVG GLUCOSE: 119 MG/DL
GFR ESTIMATION: > 60
GLOBULIN: 3.2 G/DL (ref 2.3–3.5)
GLUCOSE SERPL-MCNC: 116 MG/DL (ref 74–106)
HBA1C MFR BLD: 5.5 % (ref 4.2–6.3)
HCT VFR BLD AUTO: 37.8 % (ref 37–47)
HDL/CHOLESTEROL RATIO: 2.8 RATIO
HDLC SERPL-MCNC: 68 MG/DL
HGB BLD-MCNC: 13 G/DL (ref 12–16)
IMMATURE GRANULOCYTES: 0.5 % (ref 0–0.43)
LDLC SERPL CALC-MCNC: 97.4 MG/DL
LYMPHOCYTES NFR BLD: 28.7 % (ref 20–45)
MCH RBC QN AUTO: 31 PG (ref 27–32)
MCHC RBC AUTO-ENTMCNC: 34.4 % (ref 32–36)
MCV RBC AUTO: 90.2 FL (ref 80–99)
MONOCYTES NFR BLD: 8.6 % (ref 2–10)
NEUTROPHILS # BLD AUTO: 2.7 10*3/UL (ref 1.4–7)
NEUTROPHILS NFR BLD: 60.8 % (ref 50–80)
NUCLEATED RED BLOOD CELLS: 0 % (ref 0–0.2)
PLATELETS: 242 10*3/UL (ref 130–400)
PMV BLD AUTO: 10 FL (ref 9.2–12.2)
POTASSIUM SERPL-SCNC: 3.9 MMOL/L (ref 3.5–5.1)
PROT SERPL-MCNC: 6.7 G/DL (ref 6.4–8.2)
RBC # BLD AUTO: 4.19 10*6/UL (ref 4.2–5.4)
SODIUM BLD-SCNC: 137 MMOL/L (ref 131–143)
TRIGL SERPL-MCNC: 113 MG/DL (ref 0–149)
VLDL CHOLESTEROL: 23 MG/DL
WBC # BLD: 4.4 10*3/UL (ref 4.5–10)

## 2024-10-01 PROCEDURE — 99396 PREV VISIT EST AGE 40-64: CPT | Mod: S$GLB,,, | Performed by: NURSE PRACTITIONER

## 2024-10-01 RX ORDER — BUTALBITAL, ACETAMINOPHEN AND CAFFEINE 50; 325; 40 MG/1; MG/1; MG/1
1 TABLET ORAL EVERY 6 HOURS PRN
Qty: 90 TABLET | Refills: 2 | Status: SHIPPED | OUTPATIENT
Start: 2024-10-01

## 2024-10-01 RX ORDER — ONDANSETRON 4 MG/1
8 TABLET, ORALLY DISINTEGRATING ORAL 2 TIMES DAILY PRN
Qty: 60 TABLET | Refills: 3 | Status: SHIPPED | OUTPATIENT
Start: 2024-10-01

## 2024-10-01 RX ORDER — VENLAFAXINE HYDROCHLORIDE 150 MG/1
150 CAPSULE, EXTENDED RELEASE ORAL DAILY
Qty: 90 CAPSULE | Refills: 3 | Status: SHIPPED | OUTPATIENT
Start: 2024-10-01 | End: 2024-10-01

## 2024-10-01 RX ORDER — VENLAFAXINE HYDROCHLORIDE 225 MG/1
1 TABLET, EXTENDED RELEASE ORAL DAILY
Qty: 90 EACH | Refills: 3 | Status: SHIPPED | OUTPATIENT
Start: 2024-10-01 | End: 2025-10-01

## 2024-10-01 RX ORDER — THYROID 60 MG/1
60 TABLET ORAL
COMMUNITY

## 2024-10-01 NOTE — PROGRESS NOTES
Subjective:       Patient ID: Laura Middleton is a 49 y.o. female.    Chief Complaint: Annual Exam (Pt is here for  her annual wellness exam and medication refills. )    She is here for annual wellness visit. She is UTD routine pap surveillance. She is up to date with Tdap vaccine.  Due for annual wellness labs, will obtain today. Thyroid in managed by her integrative medicine provider. UTD with colorectal screen--Cologuard 1/13/23 negative. Her last mammogram 5/15/23 BI-RADS cat 1. She declines her pneumonia vaccine and flu vaccines.     Requesting sleep study for excessive snoring, for feeling tired after full night sleep, and excessive daytime somnolence          Review of Systems   Constitutional:  Positive for fatigue. Negative for appetite change and fever.   Respiratory:  Negative for chest tightness and shortness of breath.    Cardiovascular:  Negative for chest pain and palpitations.   Gastrointestinal:  Negative for abdominal pain, nausea and vomiting.   Skin:  Negative for color change and rash.   Neurological:  Positive for headaches. Negative for dizziness and light-headedness.   Psychiatric/Behavioral:  Negative for dysphoric mood and sleep disturbance. The patient is not nervous/anxious.            Past Medical History:  Past Medical History:   Diagnosis Date    Anxiety and depression     Atrophic vaginitis     Hypothyroidism     Insomnia     Migraines     Seasonal allergies     Tachycardia       History reviewed. No pertinent surgical history.   Review of patient's allergies indicates:  No Known Allergies   Current Outpatient Medications   Medication Sig Dispense Refill    amitriptyline (ELAVIL) 150 MG Tab TAKE 1 TABLET EVERY EVENING 90 tablet 3    butalbital-acetaminophen-caffeine -40 mg (FIORICET, ESGIC) -40 mg per tablet Take 1 tablet by mouth every 6 (six) hours as needed for Pain. 90 tablet 2    cetirizine (ZYRTEC) 10 MG tablet TAKE 1 TABLET EVERY EVENING 90 tablet 3    estradioL  (ESTRACE) 0.01 % (0.1 mg/gram) vaginal cream INSERT 1 GRAM VAGINALLY TWICE A WEEK 42.5 g 3    fluticasone propionate (FLONASE) 50 mcg/actuation nasal spray USE 2 SPRAYS IN EACH NOSTRIL TWICE A DAY 96 g 3    NP THYROID 120 mg Tab Take 1 tablet by mouth Daily.      ondansetron (ZOFRAN-ODT) 4 MG TbDL Take 2 tablets (8 mg total) by mouth 2 (two) times daily as needed (nausea/vomiting). 60 tablet 3    thyroid, pork, (NP THYROID) 60 mg Tab Take 60 mg by mouth before breakfast.      venlafaxine 225 mg TR24 Take 1 tablet by mouth once daily. 90 each 3     No current facility-administered medications for this visit.     Social History     Socioeconomic History    Marital status:    Occupational History    Occupation: Radiology tech   Tobacco Use    Smoking status: Never    Smokeless tobacco: Never   Substance and Sexual Activity    Alcohol use: Yes    Drug use: Never    Sexual activity: Yes     Partners: Male     Birth control/protection: Partner-Vasectomy      Family History   Problem Relation Name Age of Onset    Thyroid disease Mother      Diabetes Father      Hypertension Father      Hypertension Brother      Stroke Maternal Grandmother      Heart disease Paternal Grandfather      Breast cancer Maternal Aunt  62    Breast cancer Other          PGaunt        Objective:      Physical Exam  Constitutional:       Appearance: She is well-developed.   HENT:      Head: Normocephalic and atraumatic.   Eyes:      General: No scleral icterus.     Conjunctiva/sclera: Conjunctivae normal.      Pupils: Pupils are equal, round, and reactive to light.   Neck:      Thyroid: No thyroid mass.      Trachea: Trachea normal.   Cardiovascular:      Rate and Rhythm: Normal rate and regular rhythm.   Pulmonary:      Effort: Pulmonary effort is normal.      Breath sounds: Normal breath sounds.   Musculoskeletal:      Cervical back: Normal range of motion and neck supple.   Neurological:      Mental Status: She is alert and oriented to  person, place, and time.   Psychiatric:         Mood and Affect: Mood normal.         Behavior: Behavior normal.         Assessment:     1. Routine adult health maintenance    2. Snoring Active   3. Daytime somnolence Active   4. Anxiety Stable   5. Intractable chronic migraine without aura and without status migrainosus Active   6. Nausea and vomiting, unspecified vomiting type    7. Acquired hypothyroidism Active   8. Prediabetes screen    9. Breast cancer screening by mammogram      Plan:       PROBLEM LIST     Routine adult health maintenance  Comments:  decline pneumonia/flu vaccines. Need to update mammo    Snoring  Comments:  suspect DANUTA; arranging sleep study  Orders:  -     Ambulatory referral/consult to Pulmonology; Future; Expected date: 10/08/2024    Daytime somnolence  Comments:  arranging sleep study  Orders:  -     Ambulatory referral/consult to Pulmonology; Future; Expected date: 10/08/2024    Anxiety  Comments:  renewing her SNRI  Orders:  -     Discontinue: venlafaxine (EFFEXOR-XR) 150 MG Cp24; Take 1 capsule (150 mg total) by mouth once daily.  Dispense: 90 capsule; Refill: 3  -     venlafaxine 225 mg TR24; Take 1 tablet by mouth once daily.  Dispense: 90 each; Refill: 3    Intractable chronic migraine without aura and without status migrainosus  Comments:  nurtec not covered by her insurance; will renew her foricet  Orders:  -     butalbital-acetaminophen-caffeine -40 mg (FIORICET, ESGIC) -40 mg per tablet; Take 1 tablet by mouth every 6 (six) hours as needed for Pain.  Dispense: 90 tablet; Refill: 2    Nausea and vomiting, unspecified vomiting type  -     ondansetron (ZOFRAN-ODT) 4 MG TbDL; Take 2 tablets (8 mg total) by mouth 2 (two) times daily as needed (nausea/vomiting).  Dispense: 60 tablet; Refill: 3    Acquired hypothyroidism  Comments:  surveilland by her integrative medicine provider  Orders:  -     CBC Auto Differential  -     Comprehensive Metabolic Panel  -     Lipid  Panel  -     Cancel: TSH+Free T4    Prediabetes screen  -     Hemoglobin A1C    Breast cancer screening by mammogram  -     Mammo Digital Screening Bilat; Future; Expected date: 10/01/2024

## 2024-10-04 ENCOUNTER — TELEPHONE (OUTPATIENT)
Dept: FAMILY MEDICINE | Facility: CLINIC | Age: 49
End: 2024-10-04
Payer: OTHER GOVERNMENT

## 2024-10-04 ENCOUNTER — PATIENT MESSAGE (OUTPATIENT)
Dept: FAMILY MEDICINE | Facility: CLINIC | Age: 49
End: 2024-10-04
Payer: OTHER GOVERNMENT

## 2024-10-04 NOTE — TELEPHONE ENCOUNTER
----- Message from Taina Jackson NP sent at 10/4/2024  7:54 AM CDT -----  Call patient with normal results

## 2024-10-08 ENCOUNTER — PATIENT MESSAGE (OUTPATIENT)
Dept: FAMILY MEDICINE | Facility: CLINIC | Age: 49
End: 2024-10-08
Payer: OTHER GOVERNMENT

## 2024-10-08 ENCOUNTER — TELEPHONE (OUTPATIENT)
Dept: FAMILY MEDICINE | Facility: CLINIC | Age: 49
End: 2024-10-08
Payer: OTHER GOVERNMENT

## 2024-10-09 DIAGNOSIS — F41.9 ANXIETY: Primary | ICD-10-CM

## 2024-10-09 RX ORDER — VENLAFAXINE HYDROCHLORIDE 75 MG/1
75 CAPSULE, EXTENDED RELEASE ORAL DAILY
Qty: 30 CAPSULE | Refills: 11 | Status: SHIPPED | OUTPATIENT
Start: 2024-10-09 | End: 2025-10-09

## 2024-10-09 RX ORDER — VENLAFAXINE HYDROCHLORIDE 150 MG/1
150 CAPSULE, EXTENDED RELEASE ORAL DAILY
Qty: 30 CAPSULE | Refills: 11 | Status: SHIPPED | OUTPATIENT
Start: 2024-10-09 | End: 2025-10-09

## 2025-01-13 DIAGNOSIS — J30.2 SEASONAL ALLERGIES: ICD-10-CM

## 2025-01-14 RX ORDER — CETIRIZINE HYDROCHLORIDE 10 MG/1
TABLET ORAL
Qty: 90 TABLET | Refills: 3 | Status: SHIPPED | OUTPATIENT
Start: 2025-01-14

## 2025-02-12 PROBLEM — J30.2 SEASONAL ALLERGIES: Status: RESOLVED | Noted: 2020-06-02 | Resolved: 2025-02-12

## 2025-02-12 PROBLEM — G43.909 MIGRAINE: Status: ACTIVE | Noted: 2025-02-12

## 2025-02-12 NOTE — PROGRESS NOTES
"CC: WELL WOMAN (age 45-59)-perimenopausal  Patient Care Team:  Taina Jackson, NP as PCP - General (Family Medicine)  Grace Bui FNP (Family Medicine)        HPI:  Patient is a 49 y.o. who presents for her well woman exam today.  History reviewed with patient.   Patient is without complaints or concerns today. Pt seeing [facility] and they are treating her thyroid with NP thyroid. Had recent labs she brought and we reviewed them. Her cycles have been skipping up to 2 months at a time and sometimes are just spotting but sometimes they are extremely heavy and painful (has happened 2x in last year). FSH last year was 35 and is now 44 so discussed she is technically in menopausal range so bleeding should be coming to an end very soon. Also reviewed that her free t4 level was significanlty low at .7 and hypothyroidism can sometimes be associated with excessive menstrual bleeding. She said grace Bui told her all her thyroid levels were "fine" and no changes made. Explain tsh, free t4 and t3 and answered questions and pt will continue to manage her thyroid there. If she has any more excessive bleeding episodes she will let us know.    CONTRACEPTION: vasectomy  HX ABNL PAPS: none    REVIEW OF PRIOR DATA/ HEALTH MAINTENANCE:  LAST ANNUAL:   2024   LAST MMG- 2023-  Alex    LAST COLOGARD-  - neg- Q 3 years        Past Medical History:   Diagnosis Date    Anxiety and depression     Atrophic vaginitis     Hypothyroidism     Insomnia     Migraines     Seasonal allergies     Tachycardia      SURGICAL HX:   has no past surgical history on file.    SOCIAL HX:    reports that she has never smoked. She has never used smokeless tobacco. She reports current alcohol use. She reports that she does not use drugs.    Current Outpatient Medications   Medication Instructions    amitriptyline (ELAVIL) 150 MG Tab TAKE 1 TABLET EVERY EVENING    butalbital-acetaminophen-caffeine -40 mg " "(FIORICET, ESGIC) -40 mg per tablet 1 tablet, Oral, Every 6 hours PRN    cetirizine (ZYRTEC) 10 MG tablet TAKE 1 TABLET EVERY EVENING    estradioL (ESTRACE) 0.01 % (0.1 mg/gram) vaginal cream INSERT 1 GRAM VAGINALLY TWICE A WEEK    fluticasone propionate (FLONASE) 50 mcg/actuation nasal spray USE 2 SPRAYS IN EACH NOSTRIL TWICE A DAY    NP THYROID 120 mg Tab 1 tablet, Daily    venlafaxine (EFFEXOR-XR) 75 mg, Oral, Daily    venlafaxine (EFFEXOR-XR) 150 mg, Oral, Daily     VITALS:  Blood pressure (!) 148/90, height 5' 5" (1.651 m), weight 71.2 kg (157 lb), last menstrual period 01/17/2025.  Body mass index is 26.13 kg/m².     PHYSICAL EXAM-  APPEARANCE: Well appearing, in no acute distress.  CV/ PULM: no resp distress, normal resp effort  PSYCH: Normal mood and affect, cooperative  SKIN: No rashes, lesions, or abnormal bruising  ABD: Soft, no masses, tenderness, or distension  BREASTS: No skin changes,nipple dc. No palpable masses or tenderness  PELVIC:  VULVA: Normal female genitalia. No lesions  BLADDER: No suprapubic tenderness  VAGINA/ CVX:  No lesions, no d/c  UTERUS: mobile, non-tender  ADNEXA: No masses, tenderness, or fullness.  ANUS/ PERINEUM: Normal tone.  No lesions.           *patient verbally consented for exam and female chaperone present for entire exam    ASSESSMENT and PLAN:  Encounter for well woman exam with routine gynecological exam  -     Liquid-based pap smear, screening    Breast cancer screening by mammogram  -     Mammo Digital Screening Bilat w/ Kin; Future; Expected date: 02/26/2025       FOLLOWUP:  1 year for wwe or sooner prn    COUNSELING:  Patient was counseled today on recommendation for yearly pelvic exam, current Pap guidelines, self breast exams, contraception, recommendations for annual screening mammograms, and routine screening colonoscopy at age 45.  Encouraged patient to see her PCP for other health maintenance.       "

## 2025-02-13 ENCOUNTER — OFFICE VISIT (OUTPATIENT)
Dept: OBSTETRICS AND GYNECOLOGY | Facility: CLINIC | Age: 50
End: 2025-02-13
Payer: OTHER GOVERNMENT

## 2025-02-13 VITALS
HEIGHT: 65 IN | SYSTOLIC BLOOD PRESSURE: 148 MMHG | BODY MASS INDEX: 26.16 KG/M2 | DIASTOLIC BLOOD PRESSURE: 90 MMHG | WEIGHT: 157 LBS

## 2025-02-13 DIAGNOSIS — Z01.419 ENCOUNTER FOR WELL WOMAN EXAM WITH ROUTINE GYNECOLOGICAL EXAM: Primary | ICD-10-CM

## 2025-02-13 DIAGNOSIS — Z12.31 BREAST CANCER SCREENING BY MAMMOGRAM: ICD-10-CM

## 2025-02-13 PROCEDURE — 99396 PREV VISIT EST AGE 40-64: CPT | Mod: S$PBB,,, | Performed by: OBSTETRICS & GYNECOLOGY

## 2025-02-19 ENCOUNTER — RESULTS FOLLOW-UP (OUTPATIENT)
Dept: OBSTETRICS AND GYNECOLOGY | Facility: CLINIC | Age: 50
End: 2025-02-19

## 2025-03-07 ENCOUNTER — RESULTS FOLLOW-UP (OUTPATIENT)
Dept: OBSTETRICS AND GYNECOLOGY | Facility: CLINIC | Age: 50
End: 2025-03-07

## 2025-05-07 ENCOUNTER — OFFICE VISIT (OUTPATIENT)
Dept: PULMONOLOGY | Facility: CLINIC | Age: 50
End: 2025-05-07
Payer: OTHER GOVERNMENT

## 2025-05-07 VITALS
BODY MASS INDEX: 25.49 KG/M2 | HEART RATE: 98 BPM | RESPIRATION RATE: 18 BRPM | DIASTOLIC BLOOD PRESSURE: 85 MMHG | OXYGEN SATURATION: 98 % | HEIGHT: 65 IN | WEIGHT: 153 LBS | SYSTOLIC BLOOD PRESSURE: 145 MMHG

## 2025-05-07 DIAGNOSIS — E03.9 HYPOTHYROIDISM, UNSPECIFIED TYPE: ICD-10-CM

## 2025-05-07 DIAGNOSIS — F32.A DEPRESSION, UNSPECIFIED DEPRESSION TYPE: ICD-10-CM

## 2025-05-07 DIAGNOSIS — R53.83 FATIGUE, UNSPECIFIED TYPE: Primary | ICD-10-CM

## 2025-05-07 DIAGNOSIS — G47.33 OSA (OBSTRUCTIVE SLEEP APNEA): ICD-10-CM

## 2025-05-07 DIAGNOSIS — R51.9 CHRONIC NONINTRACTABLE HEADACHE, UNSPECIFIED HEADACHE TYPE: ICD-10-CM

## 2025-05-07 DIAGNOSIS — G89.29 CHRONIC NONINTRACTABLE HEADACHE, UNSPECIFIED HEADACHE TYPE: ICD-10-CM

## 2025-05-07 DIAGNOSIS — R06.83 SNORING: ICD-10-CM

## 2025-05-07 PROCEDURE — 99204 OFFICE O/P NEW MOD 45 MIN: CPT | Mod: S$PBB,,, | Performed by: INTERNAL MEDICINE

## 2025-05-07 NOTE — PROGRESS NOTES
Pulmonary Medicine Clinic Note    Patient Identification:   Patient ID: Laura Middleton is a 49 y.o. female.    Referring Provider:  Taina Jackson     Chief Complaint:  Snoring, morning headaches and fatigue    History of Present Illness:    Laura Middleton is a 49 y.o. female who presents for the above complaints.  Patient never had a sleep study before.    He goes to bed around 10:00 p.m. and wakes up anywhere between 630-730 a.m..  Upon awakening she feels tired and has frequent headaches and dry mouth.  Throughout the night she has noted tossing and turning.  She wakes up frequently but does not have trouble going back to sleep.  No excessive nocturnal urination reported.  He has been told that she snores by her  who also has obstructive sleep apnea and has been on CPAP for last 15 years.  No apneas have been witnessed.  She does not complain of waking up gasping for air.  He would like to take naps during the daytime but her work does not allow such.  Occasionally, she falls asleep while watching TV.  Denies any underlying cardiovascular diseases such as hypertension.  He does have depression and anxiety in his currently controlled with Effexor which she has been on for couple of years and denies any side effects.    He denies any upper airway or nasal surgeries or any family history of sleep disordered breathing.  She does not smoke or consume alcohol excessively.  She does have hypothyroidism and the levels have been under control with replacement therapy.  She mentions that she probably has gained about 20 lb over the last few years.    Her Mallampati class is 3 with a BMI of 25.46 kg per m2.  Neck circumference is 16 in.    Review of Systems:  Review of Systems   Constitutional:  Positive for fatigue. Negative for fever, chills, weight loss, weight gain, activity change, appetite change, night sweats and weakness.   HENT:  Negative for nosebleeds, postnasal drip, rhinorrhea, sinus  pressure, sore throat, trouble swallowing, voice change, congestion, ear pain and hearing loss.         Frequent headaches   Eyes:  Negative for redness and itching.   Respiratory:  Positive for snoring and somnolence. Negative for cough, hemoptysis, sputum production, choking, chest tightness, shortness of breath, wheezing, orthopnea, previous hospitialization due to pulmonary problems, asthma nighttime symptoms, pleurisy, dyspnea on extertion, use of rescue inhaler and Paroxysmal Nocturnal Dyspnea.    Cardiovascular:  Negative for chest pain, palpitations and leg swelling.   Genitourinary:  Negative for difficulty urinating and hematuria.   Endocrine:  Negative for polydipsia, polyphagia, cold intolerance, heat intolerance and polyuria.    Musculoskeletal:  Negative for arthralgias, back pain, gait problem, joint swelling and myalgias.   Skin:  Negative for rash.   Gastrointestinal:  Negative for nausea, vomiting, abdominal pain, abdominal distention and acid reflux.   Neurological:  Negative for dizziness, syncope, weakness, light-headedness and headaches.   Hematological:  Negative for adenopathy. Does not bruise/bleed easily and no excessive bruising.   Psychiatric/Behavioral:  Negative for confusion and sleep disturbance. The patient is not nervous/anxious.          Allergies: Review of patient's allergies indicates:  No Known Allergies    Medications:      Past Medical History:      Past Medical History:   Diagnosis Date    Anxiety and depression     Atrophic vaginitis     Hypothyroidism     Insomnia     Migraines     Seasonal allergies     Tachycardia        Family History:      Family History   Problem Relation Name Age of Onset    Thyroid disease Mother      Diabetes Father      Hypertension Father      Hypertension Brother      Stroke Maternal Grandmother      Heart disease Paternal Grandfather      Breast cancer Maternal Aunt  62    Breast cancer Other          PGaunt        Social History:      History  reviewed. No pertinent surgical history.    Physical Exam:  Vitals:    05/07/25 0929   BP: (!) 145/85   Pulse: 98   Resp: 18     Physical Exam   Constitutional: She is oriented to person, place, and time. She appears not cachectic. No distress.   HENT:   Head: Normocephalic.   Nose: Nose normal. No mucosal edema. No polyps.   Mouth/Throat: Oropharynx is clear and moist. Normal dentition. No oropharyngeal exudate. Mallampati Score: III.   Neck: No JVD present. No tracheal deviation present. No thyromegaly present.   Cardiovascular: Normal rate, regular rhythm, normal heart sounds and intact distal pulses. Exam reveals no gallop and no friction rub.   No murmur heard.  Pulmonary/Chest: Normal expansion, symmetric chest wall expansion, effort normal and breath sounds normal. No stridor. No respiratory distress. She has no decreased breath sounds. She has no wheezes. She has no rhonchi. She has no rales. Chest wall is not dull to percussion. She exhibits no tenderness. Negative for egophony. Negative for tactile fremitus.   Abdominal: Soft. Bowel sounds are normal. She exhibits no distension and no mass. There is no hepatosplenomegaly. There is no abdominal tenderness. There is no rebound and no guarding. No hernia.   Musculoskeletal:         General: No tenderness, deformity or edema. Normal range of motion.      Cervical back: Normal range of motion and neck supple.   Lymphadenopathy: No supraclavicular adenopathy is present.     She has no cervical adenopathy.     She has no axillary adenopathy.   Neurological: She is alert and oriented to person, place, and time. She displays normal reflexes. No cranial nerve deficit. She exhibits normal muscle tone.   Skin: Skin is warm and dry. No rash noted. She is not diaphoretic. No cyanosis or erythema. No pallor. Nails show no clubbing.   Psychiatric: She has a normal mood and affect. Her behavior is normal. Judgment and thought content normal.         Accessory Clinical  Data:  Previous Sleep Study findings:  None    ESS of 6    STOPBANG of 3    Do you snore loudly?  Do you feel tired, fatigued or sleepy during the day?  Has anyone observed that you stop breathing in your sleep?  Do you have or been treated for high blood pressure?  BMI:  Age > 50?  Neck circumference > 40 cm:  Gender:     Assessment and Plan:    Problem List Items Addressed This Visit    None  Visit Diagnoses         Fatigue, unspecified type    -  Primary      DANUTA (obstructive sleep apnea)   (Active)      suspect DANUTA; arranging sleep study    Relevant Orders    Home Sleep Study      Snoring   (Active)      arranging sleep study      Chronic nonintractable headache, unspecified headache type                 Patient has at least moderate pre-test probability for mild to moderate DANUTA.   She is symptomatic with morning headaches, dry mouth and daytime fatigue.  Her depression symptoms seem to be under control on current medications.  Hypothyroidism seems to be under control with the appropriate levels as per patient's account.  I have discussed with her the pathophysiology of DANUTA and the harmful cardiovascular and neurocognitive side effects,   morbidity and mortality associated with untreated sleep disordered breathing.  We discussed the available diagnostic and treatment modalities, their merits and demerits in detail.  All the questions were answered. She is agreeable to the following plan.   We will schedule her for a home sleep test, however, she was told that a negative home sleep test will not rule out clinically significant sleep disordered breathing and in later scenario she would need an in-lab polysomnography.    45 minutes time was spent on this encounter which includes face-to-face time on counseling, performing appropriate examination including risk stratification, assessing daytime sleepiness, risk for sleep disordered breathing, counseling and educating the patient regarding the pathophysiology of  obstructive sleep apnea, available diagnostic and treatment modalities, importance of compliance, techniques of desensitization etc., non face-to-face time in reviewing referring physicians notes, ordering appropriate follow-up sleep studies, documenting clinical information in the electronic medical record and care coordination.    Follow up for after sleep study.  Thank you very much for involving me in the care of this patient.  Please do not hesitate to reach me if you have any further questions or concerns.

## 2025-05-21 ENCOUNTER — OUTSIDE PLACE OF SERVICE (OUTPATIENT)
Dept: PULMONOLOGY | Facility: CLINIC | Age: 50
End: 2025-05-21
Payer: OTHER GOVERNMENT

## 2025-05-27 ENCOUNTER — DOCUMENTATION ONLY (OUTPATIENT)
Dept: SLEEP MEDICINE | Facility: HOSPITAL | Age: 50
End: 2025-05-27
Payer: OTHER GOVERNMENT

## 2025-05-27 DIAGNOSIS — G47.00 INSOMNIA, UNSPECIFIED TYPE: Chronic | ICD-10-CM

## 2025-05-27 DIAGNOSIS — F41.9 ANXIETY: Chronic | ICD-10-CM

## 2025-05-27 DIAGNOSIS — G43.719 INTRACTABLE CHRONIC MIGRAINE WITHOUT AURA AND WITHOUT STATUS MIGRAINOSUS: Chronic | ICD-10-CM

## 2025-05-28 RX ORDER — AMITRIPTYLINE HYDROCHLORIDE 150 MG/1
150 TABLET ORAL NIGHTLY
Qty: 90 TABLET | Refills: 3 | Status: SHIPPED | OUTPATIENT
Start: 2025-05-28